# Patient Record
Sex: FEMALE | Race: WHITE | NOT HISPANIC OR LATINO | URBAN - METROPOLITAN AREA
[De-identification: names, ages, dates, MRNs, and addresses within clinical notes are randomized per-mention and may not be internally consistent; named-entity substitution may affect disease eponyms.]

---

## 2018-07-19 RX ORDER — METRONIDAZOLE 500 MG
1 TABLET ORAL
Qty: 0 | Refills: 0 | COMMUNITY
Start: 2018-07-19

## 2018-07-19 RX ORDER — CEFPODOXIME PROXETIL 100 MG
1 TABLET ORAL
Qty: 0 | Refills: 0 | COMMUNITY
Start: 2018-07-19

## 2018-08-04 ENCOUNTER — INPATIENT (INPATIENT)
Facility: HOSPITAL | Age: 68
LOS: 3 days | Discharge: HOSPICE | End: 2018-08-08
Attending: FAMILY MEDICINE | Admitting: FAMILY MEDICINE
Payer: MEDICARE

## 2018-08-04 ENCOUNTER — EMERGENCY (EMERGENCY)
Facility: HOSPITAL | Age: 68
LOS: 0 days | Discharge: HOME | End: 2018-08-04
Attending: EMERGENCY MEDICINE | Admitting: EMERGENCY MEDICINE
Payer: MEDICARE

## 2018-08-04 VITALS
SYSTOLIC BLOOD PRESSURE: 139 MMHG | DIASTOLIC BLOOD PRESSURE: 84 MMHG | OXYGEN SATURATION: 100 % | RESPIRATION RATE: 18 BRPM | TEMPERATURE: 98 F | HEART RATE: 95 BPM

## 2018-08-04 VITALS
WEIGHT: 119.93 LBS | HEART RATE: 112 BPM | DIASTOLIC BLOOD PRESSURE: 96 MMHG | RESPIRATION RATE: 18 BRPM | OXYGEN SATURATION: 98 % | SYSTOLIC BLOOD PRESSURE: 124 MMHG

## 2018-08-04 VITALS
OXYGEN SATURATION: 99 % | TEMPERATURE: 98 F | RESPIRATION RATE: 20 BRPM | SYSTOLIC BLOOD PRESSURE: 153 MMHG | DIASTOLIC BLOOD PRESSURE: 93 MMHG | HEART RATE: 134 BPM

## 2018-08-04 DIAGNOSIS — F32.9 MAJOR DEPRESSIVE DISORDER, SINGLE EPISODE, UNSPECIFIED: ICD-10-CM

## 2018-08-04 DIAGNOSIS — J90 PLEURAL EFFUSION, NOT ELSEWHERE CLASSIFIED: ICD-10-CM

## 2018-08-04 DIAGNOSIS — E11.9 TYPE 2 DIABETES MELLITUS WITHOUT COMPLICATIONS: ICD-10-CM

## 2018-08-04 DIAGNOSIS — D64.9 ANEMIA, UNSPECIFIED: ICD-10-CM

## 2018-08-04 DIAGNOSIS — G40.909 EPILEPSY, UNSPECIFIED, NOT INTRACTABLE, WITHOUT STATUS EPILEPTICUS: ICD-10-CM

## 2018-08-04 DIAGNOSIS — Z85.72 PERSONAL HISTORY OF NON-HODGKIN LYMPHOMAS: ICD-10-CM

## 2018-08-04 DIAGNOSIS — Z85.3 PERSONAL HISTORY OF MALIGNANT NEOPLASM OF BREAST: ICD-10-CM

## 2018-08-04 LAB
ALBUMIN SERPL ELPH-MCNC: 2 G/DL — LOW (ref 3.3–5)
ALP SERPL-CCNC: 186 U/L — HIGH (ref 40–120)
ALT FLD-CCNC: 8 U/L — LOW (ref 12–78)
ANION GAP SERPL CALC-SCNC: 7 MMOL/L — SIGNIFICANT CHANGE UP (ref 5–17)
ANISOCYTOSIS BLD QL: SLIGHT — SIGNIFICANT CHANGE UP
APPEARANCE UR: CLEAR — SIGNIFICANT CHANGE UP
AST SERPL-CCNC: 10 U/L — LOW (ref 15–37)
BACTERIA # UR AUTO: ABNORMAL
BASO STIPL BLD QL SMEAR: PRESENT — SIGNIFICANT CHANGE UP
BASOPHILS # BLD AUTO: 0.04 K/UL — SIGNIFICANT CHANGE UP (ref 0–0.2)
BASOPHILS NFR BLD AUTO: 0.2 % — SIGNIFICANT CHANGE UP (ref 0–2)
BILIRUB SERPL-MCNC: 0.3 MG/DL — SIGNIFICANT CHANGE UP (ref 0.2–1.2)
BILIRUB UR-MCNC: NEGATIVE — SIGNIFICANT CHANGE UP
BUN SERPL-MCNC: 20 MG/DL — SIGNIFICANT CHANGE UP (ref 7–23)
CALCIUM SERPL-MCNC: 8.4 MG/DL — LOW (ref 8.5–10.1)
CHLORIDE SERPL-SCNC: 104 MMOL/L — SIGNIFICANT CHANGE UP (ref 96–108)
CO2 SERPL-SCNC: 26 MMOL/L — SIGNIFICANT CHANGE UP (ref 22–31)
COLOR SPEC: YELLOW — SIGNIFICANT CHANGE UP
COMMENT - URINE: SIGNIFICANT CHANGE UP
CREAT SERPL-MCNC: 1.27 MG/DL — SIGNIFICANT CHANGE UP (ref 0.5–1.3)
DACRYOCYTES BLD QL SMEAR: SLIGHT — SIGNIFICANT CHANGE UP
DIFF PNL FLD: ABNORMAL
EOSINOPHIL # BLD AUTO: 0.01 K/UL — SIGNIFICANT CHANGE UP (ref 0–0.5)
EOSINOPHIL NFR BLD AUTO: 0.1 % — SIGNIFICANT CHANGE UP (ref 0–6)
EPI CELLS # UR: ABNORMAL
GLUCOSE SERPL-MCNC: 171 MG/DL — HIGH (ref 70–99)
GLUCOSE UR QL: 100 MG/DL
HCT VFR BLD CALC: 31.6 % — LOW (ref 34.5–45)
HGB BLD-MCNC: 10.4 G/DL — LOW (ref 11.5–15.5)
IMM GRANULOCYTES NFR BLD AUTO: 1.2 % — SIGNIFICANT CHANGE UP (ref 0–1.5)
KETONES UR-MCNC: NEGATIVE — SIGNIFICANT CHANGE UP
LEUKOCYTE ESTERASE UR-ACNC: ABNORMAL
LYMPHOCYTES # BLD AUTO: 0.36 K/UL — LOW (ref 1–3.3)
LYMPHOCYTES # BLD AUTO: 2.1 % — LOW (ref 13–44)
MACROCYTES BLD QL: SLIGHT — SIGNIFICANT CHANGE UP
MANUAL SMEAR VERIFICATION: SIGNIFICANT CHANGE UP
MCHC RBC-ENTMCNC: 31.2 PG — SIGNIFICANT CHANGE UP (ref 27–34)
MCHC RBC-ENTMCNC: 32.9 GM/DL — SIGNIFICANT CHANGE UP (ref 32–36)
MCV RBC AUTO: 94.9 FL — SIGNIFICANT CHANGE UP (ref 80–100)
MICROCYTES BLD QL: SLIGHT — SIGNIFICANT CHANGE UP
MONOCYTES # BLD AUTO: 0.83 K/UL — SIGNIFICANT CHANGE UP (ref 0–0.9)
MONOCYTES NFR BLD AUTO: 4.8 % — SIGNIFICANT CHANGE UP (ref 2–14)
NEUTROPHILS # BLD AUTO: 15.83 K/UL — HIGH (ref 1.8–7.4)
NEUTROPHILS NFR BLD AUTO: 91.6 % — HIGH (ref 43–77)
NITRITE UR-MCNC: NEGATIVE — SIGNIFICANT CHANGE UP
NRBC # BLD: 0 /100 WBCS — SIGNIFICANT CHANGE UP (ref 0–0)
OVALOCYTES BLD QL SMEAR: SLIGHT — SIGNIFICANT CHANGE UP
PH UR: 6 — SIGNIFICANT CHANGE UP (ref 5–8)
PLAT MORPH BLD: NORMAL — SIGNIFICANT CHANGE UP
PLATELET # BLD AUTO: 78 K/UL — LOW (ref 150–400)
POIKILOCYTOSIS BLD QL AUTO: SLIGHT — SIGNIFICANT CHANGE UP
POTASSIUM SERPL-MCNC: 3.3 MMOL/L — LOW (ref 3.5–5.3)
POTASSIUM SERPL-SCNC: 3.3 MMOL/L — LOW (ref 3.5–5.3)
PROT SERPL-MCNC: 5.2 GM/DL — LOW (ref 6–8.3)
PROT UR-MCNC: 100 MG/DL
RBC # BLD: 3.33 M/UL — LOW (ref 3.8–5.2)
RBC # FLD: 17.8 % — HIGH (ref 10.3–14.5)
RBC BLD AUTO: ABNORMAL
RBC CASTS # UR COMP ASSIST: ABNORMAL /HPF (ref 0–4)
SODIUM SERPL-SCNC: 137 MMOL/L — SIGNIFICANT CHANGE UP (ref 135–145)
SP GR SPEC: 1.01 — SIGNIFICANT CHANGE UP (ref 1.01–1.02)
TROPONIN I SERPL-MCNC: <0.015 NG/ML — SIGNIFICANT CHANGE UP (ref 0.01–0.04)
UROBILINOGEN FLD QL: NEGATIVE MG/DL — SIGNIFICANT CHANGE UP
WBC # BLD: 17.28 K/UL — HIGH (ref 3.8–10.5)
WBC # FLD AUTO: 17.28 K/UL — HIGH (ref 3.8–10.5)
WBC UR QL: ABNORMAL

## 2018-08-04 PROCEDURE — 93010 ELECTROCARDIOGRAM REPORT: CPT | Mod: 76

## 2018-08-04 PROCEDURE — 70450 CT HEAD/BRAIN W/O DYE: CPT | Mod: 26

## 2018-08-04 PROCEDURE — 99285 EMERGENCY DEPT VISIT HI MDM: CPT | Mod: NC

## 2018-08-04 PROCEDURE — 71045 X-RAY EXAM CHEST 1 VIEW: CPT | Mod: 26

## 2018-08-04 RX ORDER — SODIUM CHLORIDE 9 MG/ML
3 INJECTION INTRAMUSCULAR; INTRAVENOUS; SUBCUTANEOUS ONCE
Qty: 0 | Refills: 0 | Status: COMPLETED | OUTPATIENT
Start: 2018-08-04 | End: 2018-08-04

## 2018-08-04 RX ORDER — LEVETIRACETAM 250 MG/1
1000 TABLET, FILM COATED ORAL ONCE
Qty: 0 | Refills: 0 | Status: DISCONTINUED | OUTPATIENT
Start: 2018-08-04 | End: 2018-08-04

## 2018-08-04 RX ORDER — CEFTRIAXONE 500 MG/1
1000 INJECTION, POWDER, FOR SOLUTION INTRAMUSCULAR; INTRAVENOUS ONCE
Qty: 0 | Refills: 0 | Status: COMPLETED | OUTPATIENT
Start: 2018-08-04 | End: 2018-08-04

## 2018-08-04 RX ORDER — LEVETIRACETAM 250 MG/1
1 TABLET, FILM COATED ORAL
Qty: 60 | Refills: 0 | OUTPATIENT
Start: 2018-08-04 | End: 2018-09-02

## 2018-08-04 RX ORDER — CEPHALEXIN 500 MG
1 CAPSULE ORAL
Qty: 20 | Refills: 0 | OUTPATIENT
Start: 2018-08-04 | End: 2018-08-13

## 2018-08-04 RX ORDER — LEVETIRACETAM 250 MG/1
1000 TABLET, FILM COATED ORAL ONCE
Qty: 0 | Refills: 0 | Status: COMPLETED | OUTPATIENT
Start: 2018-08-04 | End: 2018-08-04

## 2018-08-04 RX ORDER — FOSPHENYTOIN 50 MG/ML
500 INJECTION INTRAMUSCULAR; INTRAVENOUS ONCE
Qty: 0 | Refills: 0 | Status: COMPLETED | OUTPATIENT
Start: 2018-08-04 | End: 2018-08-04

## 2018-08-04 RX ORDER — LACOSAMIDE 50 MG/1
100 TABLET ORAL ONCE
Qty: 0 | Refills: 0 | Status: DISCONTINUED | OUTPATIENT
Start: 2018-08-04 | End: 2018-08-04

## 2018-08-04 RX ORDER — CEPHALEXIN 500 MG
500 CAPSULE ORAL ONCE
Qty: 0 | Refills: 0 | Status: COMPLETED | OUTPATIENT
Start: 2018-08-04 | End: 2018-08-04

## 2018-08-04 RX ORDER — CEFTRIAXONE 500 MG/1
1 INJECTION, POWDER, FOR SOLUTION INTRAMUSCULAR; INTRAVENOUS ONCE
Qty: 0 | Refills: 0 | Status: DISCONTINUED | OUTPATIENT
Start: 2018-08-04 | End: 2018-08-04

## 2018-08-04 RX ADMIN — LEVETIRACETAM 400 MILLIGRAM(S): 250 TABLET, FILM COATED ORAL at 16:47

## 2018-08-04 RX ADMIN — LACOSAMIDE 100 MILLIGRAM(S): 50 TABLET ORAL at 22:21

## 2018-08-04 RX ADMIN — FOSPHENYTOIN 120 MILLIGRAM(S) PE: 50 INJECTION INTRAMUSCULAR; INTRAVENOUS at 21:03

## 2018-08-04 RX ADMIN — Medication 500 MILLIGRAM(S): at 19:29

## 2018-08-04 RX ADMIN — Medication 1 MILLIGRAM(S): at 21:03

## 2018-08-04 RX ADMIN — SODIUM CHLORIDE 3 MILLILITER(S): 9 INJECTION INTRAMUSCULAR; INTRAVENOUS; SUBCUTANEOUS at 12:51

## 2018-08-04 RX ADMIN — CEFTRIAXONE 1000 MILLIGRAM(S): 500 INJECTION, POWDER, FOR SOLUTION INTRAMUSCULAR; INTRAVENOUS at 21:24

## 2018-08-04 RX ADMIN — FOSPHENYTOIN 500 MILLIGRAM(S) PE: 50 INJECTION INTRAMUSCULAR; INTRAVENOUS at 21:03

## 2018-08-04 RX ADMIN — LACOSAMIDE 120 MILLIGRAM(S): 50 TABLET ORAL at 21:51

## 2018-08-04 NOTE — ED ADULT TRIAGE NOTE - CHIEF COMPLAINT QUOTE
Patient was discharged from  and while in EMS transport back to NH, had a reported seizure. In triage patient seen by MD Asif and appears post-ictal. Moved to trauma room.

## 2018-08-04 NOTE — ED PROVIDER NOTE - PROGRESS NOTE DETAILS
ELLIE No additional seizures in the ED.  Pt now awake and responding to questions. PT Tx with 3 agents and abortive medication.  Given presentation and findings, patient requires hospitalization likely requiring 4 days of inpatient care for medication titration.  Patient signed out to Dr. Higginbotham.  Patient's history, vital signs, lab results, imaging, pertinent findings, and clinical condition reviewed during sign out.  At sign out patient admitted in hemodynamically stable condition in no acute distress.

## 2018-08-04 NOTE — ED ADULT NURSE NOTE - NSIMPLEMENTINTERV_GEN_ALL_ED
Implemented All Fall with Harm Risk Interventions:  [place] to call system. Call bell, personal items and telephone within reach. Instruct patient to call for assistance. Room bathroom lighting operational. Non-slip footwear when patient is off stretcher. Physically safe environment: no spills, clutter or unnecessary equipment. Stretcher in lowest position, wheels locked, appropriate side rails in place. Provide visual cue, wrist band, yellow gown, etc. Monitor gait and stability. Monitor for mental status changes and reorient to person, place, and time. Review medications for side effects contributing to fall risk. Reinforce activity limits and safety measures with patient and family. Provide visual clues: red socks.

## 2018-08-04 NOTE — ED PROVIDER NOTE - PMH
Anemia    Breast cancer    Depression    DM (diabetes mellitus)    HTN (hypertension)    Lymphoma    Pleural effusion

## 2018-08-04 NOTE — ED ADULT NURSE NOTE - OBJECTIVE STATEMENT
Pt sent from [place] for seizures, another seizure witnessed by EMS prior to arrival.  Extensive cancer history.  RCW port accessed by IV team.  Cardiac and VS monitoring initiated on arrival.  Pt lethargic, unable to answer questions.  Incontinent, pericare provided, IAD to diony area and nonblanchable areas over sacrum without skin breakdown.

## 2018-08-04 NOTE — ED PROVIDER NOTE - OBJECTIVE STATEMENT
67 YOF with PMHx of B cell lymphoma, breast ca, DM2, HTN, anemia, depression, seizures, pleural effusion presenting to the ED from Nursing Home c/o x2 seizures this morning, x1 seizure in the ambulance to the hospital. Pt is postictal and nonverbal.  JW PT at baseline 10 minutes ago loaded with Keppra suffered a seizure after getting into the ambulance and driving 2 streets down.  Pt returns today now awake responsive to stimuli and post ictal. 67 YOF with PMHx of B cell lymphoma, breast ca, DM2, HTN, anemia, depression, seizures, pleural effusion presenting to the ED from Nursing Home c/o x2 seizures this morning, x1 seizure in the ambulance to the hospital.  JW PT at baseline 10 minutes ago loaded with Keppra suffered a seizure after getting into the ambulance and driving 2 streets down.  Pt returns today now awake responsive to stimuli, alert, oriented, and mildly post ictal.

## 2018-08-04 NOTE — ED ADULT NURSE NOTE - OBJECTIVE STATEMENT
pt presents to the ed by ambulance after being recently dc'd and as per ems she had a seizure. Pt is now post ictal, pt bit R side of her tongue and bottom part of her lip. Pt is tachycardic and hypertensive, no signs of difficulty breathing.

## 2018-08-04 NOTE — ED PROVIDER NOTE - MEDICAL DECISION MAKING DETAILS
67 YOF with PMHx of B cell lymphoma, breast ca, DM2, HTN, anemia, depression, seizures, pleural effusion presenting to the ED from Nursing Home c/o x2 seizures this morning, x1 seizure in the ambulance to the hospital.  HTN, tachycardia.  Post ictal state.  Lip abrasion.  Pt recently seized refractory to single agent loading and outpatient management.  Given presentation will medicate with additional agents and admit to telemetry for further neurology evaluation.

## 2018-08-04 NOTE — ED ADULT TRIAGE NOTE - CHIEF COMPLAINT QUOTE
pt presents to ED due to seizure sent from NH as per EMS pt had 4 seizures this AM and was having a seizure when pulling into ambulance bay

## 2018-08-04 NOTE — ED PROVIDER NOTE - ENMT, MLM
Airway patent, Nasal mucosa clear. Mouth with normal mucosa. Throat has no vesicles, no oropharyngeal exudates and uvula is midline. +small abrasion on lip

## 2018-08-04 NOTE — ED PROVIDER NOTE - NS_ ATTENDINGSCRIBEDETAILS _ED_A_ED_FT
The scribe's documentation has been prepared under my direction and personally reviewed by me in its entirety.  I confirm that the note above accurately reflects all my work, treatment, procedures, and decision making except where otherwise noted or amended by me.  Vasile Chan M.D.

## 2018-08-04 NOTE — ED ADULT NURSE NOTE - CHPI ED NUR SYMPTOMS NEG
no fever/no pain/no vomiting/no nausea/no tingling/no chills/no dizziness/no decreased eating/drinking

## 2018-08-04 NOTE — ED ADULT NURSE NOTE - INTERVENTIONS DEFINITIONS
Call bell, personal items and telephone within reach/Stretcher in lowest position, wheels locked, appropriate side rails in place

## 2018-08-04 NOTE — ED PROVIDER NOTE - PROGRESS NOTE DETAILS
ELLIE PT signed out to me in stable condition.  66 y/o f with PMHx of B cell lymphoma, breast ca, DM2, HTN, anemia, depression, seizures, pleural effusion presenting to the ED from Nursing Home c/o x2 seizures this morning, x1 seizure in the ambulance to the hospital.  Pt now awake alert in NAD.  Repeat exam reveals no signs of trauma. Pt states Seizures occurred in bed.  Sign out CT pending.  Will Tx with Keppra, Pt unclear if she is receiving seizure medications, continue to evaluate for secondary causes of seizure, symptomatic treatment, discuss with neurologist, reassess. JW Contacted NH.  No doctor available to discuss case. Redosed with Keppra.  Will prescribe Keflex. JW PT signed out to me in stable condition.  68 y/o f with PMHx of B cell lymphoma, breast ca, DM2, HTN, anemia, depression, seizures, pleural effusion presenting to the ED from Nursing Home c/o x2 seizures this morning, x1 seizure in the ambulance to the hospital.  Pt now awake alert in NAD.  Repeat exam reveals no signs of trauma. Pt states Seizures occurred in bed.  Sign out CT pending.  Will Tx with Keppra, Evaluate for secondary causes of seizure, symptomatic treatment, discuss with neurologist, reassess. JW Contacted NH.  No doctor available to discuss case. Redosed with Keppra.  Will prescribe Keflex.  At discharge PT AO 3 NAD responding to questions.  Understands that she may have a UTI and needs to follow up with neurologist and PMD.  PT loaded with Keppra given Keflex in the ED.  Pt endorses keppra administration at the NH. Repeat exam: Physical Exam: General: alert and oriented x3 in no acute distress.  Cardiovascular: Regular rate and rhythm, S1 and S2 normal.  No murmurs, rubs, or gallops.  Pulses equal bilaterally.  No carotid bruits.  No peripheral edema or JVD.  Respiratory: No respiratory distress.  Lungs clear to auscultation bilaterally without adventitial breath sounds.  Abdominal: Non-distended, normal bowel sounds in all four quadrants, non tender to percussion and palpation in all four quadrants.  No mass, rigidity, or guarding.  Extremities: No sign of trauma, inflammation, or effusion.  I reviewed the alarm symptoms of this patient's diagnosis and discussed criteria for their return to the emergency department.  I instructed the patient to return to the emergency department with any alarm symptoms for their specific diagnosis including fevers, chills, vomiting, any worsening symptoms, and any other concerns.  I instructed this patient to call their primary doctor today, to inform them of their visit to the emergency department, and to obtain a repeat evaluation in the next 24 hours.  This patient understood and agreed with our plan for follow up.  At the time of discharge this patient remained in stable condition, in no acute distress, with stable vital signs. W Contacted NH.  No doctor available to discuss case. Redosed with Keppra.  Will prescribe Keflex.  At discharge PT AO 3 NAD responding to questions.  Understands that she may have a UTI and needs to follow up with neurologist and PMD.  PT loaded with Keppra given Keflex in the ED.  Pt endorses keppra administration at the NH. Repeat exam: Physical Exam: General: alert and oriented x3 in no acute distress.  Cardiovascular: Regular rate and rhythm, S1 and S2 normal.  No murmurs, rubs, or gallops.  Pulses equal bilaterally.  No carotid bruits.  No peripheral edema or JVD.  Respiratory: No respiratory distress.  Lungs clear to auscultation bilaterally without adventitial breath sounds.  Abdominal: Non-distended, normal bowel sounds in all four quadrants, non tender to percussion and palpation in all four quadrants.  No mass, rigidity, or guarding.  Extremities: No sign of trauma, inflammation, or effusion.  I reviewed the alarm symptoms of this patient's diagnosis and discussed criteria for their return to the emergency department.  I instructed the patient to return to the emergency department with any alarm symptoms for their specific diagnosis including fevers, chills, vomiting, any worsening symptoms, and any other concerns.  I instructed this patient to call their primary doctor today, to inform them of their visit to the emergency department, and to obtain a repeat evaluation in the next 24 hours.  This patient understood and agreed with our plan for follow up.  At the time of discharge this patient remained in stable condition, in no acute distress, with stable vital signs. JW PT signed out to me in stable condition NAD pending CT scan results. With normal CT PT to be discharged with outpatient neurology follow up. JW  Reevaluation note.  68 y/o f with PMHx of B cell lymphoma, breast ca, DM2, HTN, anemia, depression, seizures, pleural effusion presenting to the ED from Nursing Home c/o x2 seizures this morning, x1 seizure in the ambulance to the hospital.  Pt now awake alert in NAD.  Repeat exam reveals no signs of trauma. Pt states Seizures occurred in bed.  Sign out CT pending.  Will Tx with Keppra, Evaluate for secondary causes of seizure, symptomatic treatment, discuss with neurologist, reassess. JW PT initially evaluated by Dr. Mayes signed out to me in stable condition NAD pending CT scan results. With normal CT PT to be discharged with outpatient neurology follow up.

## 2018-08-04 NOTE — ED PROVIDER NOTE - OBJECTIVE STATEMENT
66 y/o f with PMHx of B cell lymphoma, breast ca, DM2, HTN, anemia, depression, seizures, pleural effusion presenting to the ED from Nursing Home c/o x2 seizures this morning, x1 seizure in the ambulance to the hospital. Pt is postictal and nonverbal.

## 2018-08-05 DIAGNOSIS — Z90.11 ACQUIRED ABSENCE OF RIGHT BREAST AND NIPPLE: Chronic | ICD-10-CM

## 2018-08-05 LAB
ANION GAP SERPL CALC-SCNC: 11 MMOL/L — SIGNIFICANT CHANGE UP (ref 5–17)
BASOPHILS # BLD AUTO: 0.07 K/UL — SIGNIFICANT CHANGE UP (ref 0–0.2)
BASOPHILS NFR BLD AUTO: 0.4 % — SIGNIFICANT CHANGE UP (ref 0–2)
BUN SERPL-MCNC: 17 MG/DL — SIGNIFICANT CHANGE UP (ref 7–23)
CALCIUM SERPL-MCNC: 8.7 MG/DL — SIGNIFICANT CHANGE UP (ref 8.5–10.1)
CHLORIDE SERPL-SCNC: 104 MMOL/L — SIGNIFICANT CHANGE UP (ref 96–108)
CK SERPL-CCNC: 12 U/L — LOW (ref 26–192)
CK SERPL-CCNC: 9 U/L — LOW (ref 26–192)
CO2 SERPL-SCNC: 25 MMOL/L — SIGNIFICANT CHANGE UP (ref 22–31)
CREAT SERPL-MCNC: 1.11 MG/DL — SIGNIFICANT CHANGE UP (ref 0.5–1.3)
CULTURE RESULTS: SIGNIFICANT CHANGE UP
EOSINOPHIL # BLD AUTO: 0.05 K/UL — SIGNIFICANT CHANGE UP (ref 0–0.5)
EOSINOPHIL NFR BLD AUTO: 0.3 % — SIGNIFICANT CHANGE UP (ref 0–6)
FERRITIN SERPL-MCNC: 3124 NG/ML — HIGH (ref 15–150)
FOLATE SERPL-MCNC: 5.6 NG/ML — SIGNIFICANT CHANGE UP
GLUCOSE BLDC GLUCOMTR-MCNC: 111 MG/DL — HIGH (ref 70–99)
GLUCOSE BLDC GLUCOMTR-MCNC: 147 MG/DL — HIGH (ref 70–99)
GLUCOSE SERPL-MCNC: 111 MG/DL — HIGH (ref 70–99)
HCT VFR BLD CALC: 31.2 % — LOW (ref 34.5–45)
HGB BLD-MCNC: 10.2 G/DL — LOW (ref 11.5–15.5)
IMM GRANULOCYTES NFR BLD AUTO: 1 % — SIGNIFICANT CHANGE UP (ref 0–1.5)
IRON SATN MFR SERPL: 59 UG/DL — SIGNIFICANT CHANGE UP (ref 30–160)
IRON SATN MFR SERPL: 69 % — HIGH (ref 14–50)
LACTATE SERPL-SCNC: 1 MMOL/L — SIGNIFICANT CHANGE UP (ref 0.7–2)
LYMPHOCYTES # BLD AUTO: 0.44 K/UL — LOW (ref 1–3.3)
LYMPHOCYTES # BLD AUTO: 2.5 % — LOW (ref 13–44)
MAGNESIUM SERPL-MCNC: 1.4 MG/DL — LOW (ref 1.6–2.6)
MCHC RBC-ENTMCNC: 31.3 PG — SIGNIFICANT CHANGE UP (ref 27–34)
MCHC RBC-ENTMCNC: 32.7 GM/DL — SIGNIFICANT CHANGE UP (ref 32–36)
MCV RBC AUTO: 95.7 FL — SIGNIFICANT CHANGE UP (ref 80–100)
MONOCYTES # BLD AUTO: 0.8 K/UL — SIGNIFICANT CHANGE UP (ref 0–0.9)
MONOCYTES NFR BLD AUTO: 4.5 % — SIGNIFICANT CHANGE UP (ref 2–14)
NEUTROPHILS # BLD AUTO: 16.16 K/UL — HIGH (ref 1.8–7.4)
NEUTROPHILS NFR BLD AUTO: 91.3 % — HIGH (ref 43–77)
NRBC # BLD: 0 /100 WBCS — SIGNIFICANT CHANGE UP (ref 0–0)
NT-PROBNP SERPL-SCNC: HIGH PG/ML (ref 0–125)
PLATELET # BLD AUTO: 78 K/UL — LOW (ref 150–400)
POTASSIUM SERPL-MCNC: 3.2 MMOL/L — LOW (ref 3.5–5.3)
POTASSIUM SERPL-SCNC: 3.2 MMOL/L — LOW (ref 3.5–5.3)
RBC # BLD: 3.26 M/UL — LOW (ref 3.8–5.2)
RBC # FLD: 17.6 % — HIGH (ref 10.3–14.5)
SODIUM SERPL-SCNC: 140 MMOL/L — SIGNIFICANT CHANGE UP (ref 135–145)
SPECIMEN SOURCE: SIGNIFICANT CHANGE UP
TIBC SERPL-MCNC: 85 UG/DL — LOW (ref 220–430)
TROPONIN I SERPL-MCNC: <0.015 NG/ML — SIGNIFICANT CHANGE UP (ref 0.01–0.04)
TROPONIN I SERPL-MCNC: <0.015 NG/ML — SIGNIFICANT CHANGE UP (ref 0.01–0.04)
UIBC SERPL-MCNC: 26 UG/DL — LOW (ref 110–370)
VIT B12 SERPL-MCNC: 678 PG/ML — SIGNIFICANT CHANGE UP (ref 232–1245)
WBC # BLD: 17.69 K/UL — HIGH (ref 3.8–10.5)
WBC # FLD AUTO: 17.69 K/UL — HIGH (ref 3.8–10.5)

## 2018-08-05 PROCEDURE — 99285 EMERGENCY DEPT VISIT HI MDM: CPT

## 2018-08-05 RX ORDER — DEXTROSE MONOHYDRATE, SODIUM CHLORIDE, AND POTASSIUM CHLORIDE 50; .745; 4.5 G/1000ML; G/1000ML; G/1000ML
1000 INJECTION, SOLUTION INTRAVENOUS
Qty: 0 | Refills: 0 | Status: DISCONTINUED | OUTPATIENT
Start: 2018-08-05 | End: 2018-08-05

## 2018-08-05 RX ORDER — SODIUM CHLORIDE 9 MG/ML
1000 INJECTION, SOLUTION INTRAVENOUS
Qty: 0 | Refills: 0 | Status: DISCONTINUED | OUTPATIENT
Start: 2018-08-05 | End: 2018-08-06

## 2018-08-05 RX ORDER — DEXTROSE 50 % IN WATER 50 %
12.5 SYRINGE (ML) INTRAVENOUS ONCE
Qty: 0 | Refills: 0 | Status: DISCONTINUED | OUTPATIENT
Start: 2018-08-05 | End: 2018-08-06

## 2018-08-05 RX ORDER — OXYCODONE HYDROCHLORIDE 5 MG/1
5 TABLET ORAL EVERY 4 HOURS
Qty: 0 | Refills: 0 | Status: DISCONTINUED | OUTPATIENT
Start: 2018-08-05 | End: 2018-08-08

## 2018-08-05 RX ORDER — POTASSIUM CHLORIDE 20 MEQ
40 PACKET (EA) ORAL EVERY 4 HOURS
Qty: 0 | Refills: 0 | Status: COMPLETED | OUTPATIENT
Start: 2018-08-05 | End: 2018-08-06

## 2018-08-05 RX ORDER — NYSTATIN CREAM 100000 [USP'U]/G
1 CREAM TOPICAL THREE TIMES A DAY
Qty: 0 | Refills: 0 | Status: DISCONTINUED | OUTPATIENT
Start: 2018-08-05 | End: 2018-08-08

## 2018-08-05 RX ORDER — MIRTAZAPINE 45 MG/1
15 TABLET, ORALLY DISINTEGRATING ORAL AT BEDTIME
Qty: 0 | Refills: 0 | Status: DISCONTINUED | OUTPATIENT
Start: 2018-08-05 | End: 2018-08-08

## 2018-08-05 RX ORDER — DEXTROSE 50 % IN WATER 50 %
25 SYRINGE (ML) INTRAVENOUS ONCE
Qty: 0 | Refills: 0 | Status: DISCONTINUED | OUTPATIENT
Start: 2018-08-05 | End: 2018-08-06

## 2018-08-05 RX ORDER — INSULIN LISPRO 100/ML
VIAL (ML) SUBCUTANEOUS
Qty: 0 | Refills: 0 | Status: DISCONTINUED | OUTPATIENT
Start: 2018-08-05 | End: 2018-08-06

## 2018-08-05 RX ORDER — MAGNESIUM SULFATE 500 MG/ML
2 VIAL (ML) INJECTION ONCE
Qty: 0 | Refills: 0 | Status: COMPLETED | OUTPATIENT
Start: 2018-08-05 | End: 2018-08-05

## 2018-08-05 RX ORDER — MAGNESIUM OXIDE 400 MG ORAL TABLET 241.3 MG
400 TABLET ORAL
Qty: 0 | Refills: 0 | Status: DISCONTINUED | OUTPATIENT
Start: 2018-08-05 | End: 2018-08-08

## 2018-08-05 RX ORDER — DEXTROSE MONOHYDRATE, SODIUM CHLORIDE, AND POTASSIUM CHLORIDE 50; .745; 4.5 G/1000ML; G/1000ML; G/1000ML
1000 INJECTION, SOLUTION INTRAVENOUS
Qty: 0 | Refills: 0 | Status: DISCONTINUED | OUTPATIENT
Start: 2018-08-05 | End: 2018-08-06

## 2018-08-05 RX ORDER — PANTOPRAZOLE SODIUM 20 MG/1
40 TABLET, DELAYED RELEASE ORAL
Qty: 0 | Refills: 0 | Status: DISCONTINUED | OUTPATIENT
Start: 2018-08-05 | End: 2018-08-08

## 2018-08-05 RX ORDER — CEFTRIAXONE 500 MG/1
1000 INJECTION, POWDER, FOR SOLUTION INTRAMUSCULAR; INTRAVENOUS EVERY 24 HOURS
Qty: 0 | Refills: 0 | Status: DISCONTINUED | OUTPATIENT
Start: 2018-08-05 | End: 2018-08-06

## 2018-08-05 RX ORDER — GLUCAGON INJECTION, SOLUTION 0.5 MG/.1ML
1 INJECTION, SOLUTION SUBCUTANEOUS ONCE
Qty: 0 | Refills: 0 | Status: DISCONTINUED | OUTPATIENT
Start: 2018-08-05 | End: 2018-08-06

## 2018-08-05 RX ORDER — POTASSIUM CHLORIDE 20 MEQ
40 PACKET (EA) ORAL ONCE
Qty: 0 | Refills: 0 | Status: COMPLETED | OUTPATIENT
Start: 2018-08-05 | End: 2018-08-05

## 2018-08-05 RX ORDER — CEFTRIAXONE 500 MG/1
1 INJECTION, POWDER, FOR SOLUTION INTRAMUSCULAR; INTRAVENOUS EVERY 24 HOURS
Qty: 0 | Refills: 0 | Status: DISCONTINUED | OUTPATIENT
Start: 2018-08-05 | End: 2018-08-05

## 2018-08-05 RX ORDER — INSULIN LISPRO 100/ML
VIAL (ML) SUBCUTANEOUS AT BEDTIME
Qty: 0 | Refills: 0 | Status: DISCONTINUED | OUTPATIENT
Start: 2018-08-05 | End: 2018-08-06

## 2018-08-05 RX ORDER — DEXTROSE 50 % IN WATER 50 %
15 SYRINGE (ML) INTRAVENOUS ONCE
Qty: 0 | Refills: 0 | Status: DISCONTINUED | OUTPATIENT
Start: 2018-08-05 | End: 2018-08-06

## 2018-08-05 RX ORDER — LEVETIRACETAM 250 MG/1
500 TABLET, FILM COATED ORAL
Qty: 0 | Refills: 0 | Status: DISCONTINUED | OUTPATIENT
Start: 2018-08-05 | End: 2018-08-08

## 2018-08-05 RX ADMIN — Medication 50 GRAM(S): at 22:16

## 2018-08-05 RX ADMIN — CEFTRIAXONE 1000 MILLIGRAM(S): 500 INJECTION, POWDER, FOR SOLUTION INTRAMUSCULAR; INTRAVENOUS at 22:16

## 2018-08-05 RX ADMIN — DEXTROSE MONOHYDRATE, SODIUM CHLORIDE, AND POTASSIUM CHLORIDE 50 MILLILITER(S): 50; .745; 4.5 INJECTION, SOLUTION INTRAVENOUS at 12:11

## 2018-08-05 RX ADMIN — Medication 40 MILLIEQUIVALENT(S): at 12:11

## 2018-08-05 RX ADMIN — NYSTATIN CREAM 1 APPLICATION(S): 100000 CREAM TOPICAL at 21:51

## 2018-08-05 RX ADMIN — Medication 2.5 MILLIGRAM(S): at 12:11

## 2018-08-05 RX ADMIN — LEVETIRACETAM 500 MILLIGRAM(S): 250 TABLET, FILM COATED ORAL at 21:51

## 2018-08-05 RX ADMIN — MIRTAZAPINE 15 MILLIGRAM(S): 45 TABLET, ORALLY DISINTEGRATING ORAL at 21:50

## 2018-08-05 RX ADMIN — PANTOPRAZOLE SODIUM 40 MILLIGRAM(S): 20 TABLET, DELAYED RELEASE ORAL at 12:11

## 2018-08-05 RX ADMIN — MAGNESIUM OXIDE 400 MG ORAL TABLET 400 MILLIGRAM(S): 241.3 TABLET ORAL at 21:50

## 2018-08-05 RX ADMIN — Medication 40 MILLIEQUIVALENT(S): at 21:50

## 2018-08-05 NOTE — ED ADULT NURSE REASSESSMENT NOTE - NS ED NURSE REASSESS COMMENT FT1
unable to give potassium and mag meds, pt takes medications with apple sauce, no apple sauce in ER, check both main and peds, spoke with jon, will be given on 3E

## 2018-08-05 NOTE — H&P ADULT - ASSESSMENT
66 y/o female with PMHx of B cell lymphoma, breast ca, DM2, HTN, anemia, depression, seizures, h/o GI bleed, Thrombocytopenia, muscle weakness  presenting to the ED from Nursing Home [place] s/p 2 episodes of seizures as per nursing documentation. 1 seizure happened  in the ambulance to the hospital, she was loaded with Keppra.   Pt in ED was awake responsive to stimuli, alert, oriented, and mildly post ictal. Patient poor historian, but reports being diagnosed with UTI and strated on Abx at SNF, denies CP, cough, palpitations HA, visual changes, numbness or weakness, except generalized weakness, denies abd pain, urinary sx.  Patient does not recall events, postictal , confused where she is , but able to answer questions appropriately.     in Ed - T(F): 97.7 , Max: 98.1 HR: 91  (75 - 134) BP: 133/85 (116/63 - 164/90) RR: 14  (13 - 20) SpO2: 100% ( 98% - 100%) EKG - sinus tachy 130 on presentation, with STT depressions in lateral leads, troponin x1 neg, s/p Ativan, Fosphenytoin and Vimpat IV  WBC 17, Hb 10.4, Pl 78, K 3.3, Cr 1.27 , Glu 171, UA - pyuria , CT head - old infarct, CXR - left pleural effusion    * Recurrent seizures  * Metabolic encephalopathy due to UTI, seizures   - tele  - s/p IV ativan, Fosphenytoin, Vimpat  - c/w with higher dose of keppra 500 BID  - neurology evaluation  - MRI/MRA - r/o stoke  - replace electrolytes  - check TSH, B12  - speech and swallow evaluation  - PT evaluation      * UTI  - IV fluids  - IV ceftriaxone  - f/u urine cx  - trend WBC     * Thrombocytopenia, Anemia h/o breast CA, B- cell lymphoma  - c/w PO prednisone   - oncology consult  - monitor    * JOSE  - IV fluids  - avoid nephrotoxins    * Hypokalemia  - replace  - check Mg  - c/w PO magnesium    * DM2  - check a1C  - ISS    * GERD h/o GI bleed  - c/w PPI    * Protein-calories Malnutrition severe  - nutritional consult  - c/w remeron    * DVT proph IPC, due to low platelets will hold heparin fr now   IMPROVE VTE Individual Risk Assessment    RISK                                                                Points    [  ] Previous VTE                                                  3    [x  ] Thrombophilia                                               2    [  ] Lower limb paralysis                                      2        (unable to hold up >15 seconds)      [ x ] Current Cancer                                              2         (within 6 months)    [  ] Immobilization > 24 hrs                                1    [  ] ICU/CCU stay > 24 hours                              1    [ x ] Age > 60                                                      1    IMPROVE VTE Score _____5____    Time spend 75 min

## 2018-08-05 NOTE — H&P ADULT - NSHPPHYSICALEXAM_GEN_ALL_CORE
PHYSICAL EXAM:    Constitutional: NAD, awake and alert, malnourished   HEENT: PERR, EOMI, Normal Hearing, MMM  Neck: Soft and supple, No LAD, No JVD  Respiratory: Breath sounds are clear bilaterally, No wheezing, rales or rhonchi  Cardiovascular: S1 and S2, regular rate and rhythm, no Murmurs, gallops or rubs  Gastrointestinal: Bowel Sounds present, soft, nontender, nondistended, no guarding, no rebound  Extremities: No peripheral edema  Vascular: 2+ peripheral pulses  Neurological: A/O x 3, no focal deficits  Musculoskeletal: 5/5 strength b/l upper and lower extremities  Skin: No rashes, multiple petechia over trunk, lips  rectal : deferred, not indicated

## 2018-08-05 NOTE — H&P ADULT - CLICK TO LAUNCH ORM
History & Physical Examination    Patient Name: Kelli Lopes  MRN: [id number]  CSN: [id number]  YOB: 1996    Diagnosis: ulcerative colitis and PSc    Present Illness: diarrhea chroninc      No prescriptions prior to admission    No current .

## 2018-08-05 NOTE — SWALLOW BEDSIDE ASSESSMENT ADULT - PHARYNGEAL PHASE
mild latency in swallow onset: observable laryngeal lift no overt s/s penetration of aspiration at bedside ./Within functional limits

## 2018-08-05 NOTE — H&P ADULT - HISTORY OF PRESENT ILLNESS
68 y/o female with PMHx of B cell lymphoma, breast ca, DM2, HTN, anemia, depression, seizures, h/o GI bleed, Thrombocytopenia, muscle weakness  presenting to the ED from Nursing Home [place] s/p 2 episodes of seizures as per nursing documentation. 1 seizure happened  in the ambulance to the hospital, she was loaded with Keppra.   Pt in ED was awake responsive to stimuli, alert, oriented, and mildly post ictal. Patient poor historian, but reports being diagnosed with UTI and strated on Abx at SNF, denies CP, cough, palpitations HA, visual changes, numbness or weakness, except generalized weakness, denies abd pain, urinary sx.  Patient does not recall events, postictal , confused where she is , but able to answer questions appropriately.     in Ed - T(F): 97.7 , Max: 98.1 HR: 91  (75 - 134) BP: 133/85 (116/63 - 164/90) RR: 14  (13 - 20) SpO2: 100% ( 98% - 100%) EKG - sinus tachy 130 on presentation, with STT depressions in lateral leads, troponin x1 neg, s/p Ativan, Fosphenytoin and Vimpat IV  WBC 17, Hb 10.4, Pl 78, K 3.3, Cr 1.27 , Glu 171, UA - pyuria , CT head - old infarct, CXR - left pleural effusion

## 2018-08-05 NOTE — SWALLOW BEDSIDE ASSESSMENT ADULT - ORAL PHASE
reduction in efficiency of mastication 2/2 lack of full dentition, but adequate for soft chewable and Pt able to clear bolus form oral cavity

## 2018-08-05 NOTE — SWALLOW BEDSIDE ASSESSMENT ADULT - NS SPL SWALLOW CLINIC TRIAL FT
Rx: soft mechanical DM, thin liquids; straw OK, meds WHOLE in apple sauce.  Assist as necessary.  ?Supplement drink?   Do not rush Pt.     Disc with MD and with NSg and Pt.   Service will follow for tolerance.

## 2018-08-05 NOTE — SWALLOW BEDSIDE ASSESSMENT ADULT - SWALLOW EVAL: CRITERIA FOR SKILLED INTERVENTION MET
will follow for monitor of diet tolerance/current level of function same as previous level of function

## 2018-08-05 NOTE — ED ADULT NURSE REASSESSMENT NOTE - NS ED NURSE REASSESS COMMENT FT1
pt aaox4, received pt at 1030, port already accessed, resting comfortably on stretcher, no s/sx of distress noted, pt tolerated PO meds with apple sauce, speech and swallow test done at the bedside.  received an order from md pearce to draw blood from port, phleb unable to draw blood, port flushing without difficulty, blood draw noted from the line, blood obtained and sent.  awaiting for bed.

## 2018-08-05 NOTE — H&P ADULT - FAMILY HISTORY
Mother  Still living? Unknown  Family history of diabetes mellitus, Age at diagnosis: Age Unknown     Father  Still living? Unknown  Family history of stroke, Age at diagnosis: Age Unknown

## 2018-08-05 NOTE — SWALLOW BEDSIDE ASSESSMENT ADULT - SWALLOW EVAL: DIAGNOSIS
oral-pharayngeal skills within functional limits for po intake at level of SOFT MECHANICAL and THIN liquids.   Overall profile is of marked deconditioning, plus reduced dentition.

## 2018-08-05 NOTE — SWALLOW BEDSIDE ASSESSMENT ADULT - COMMENTS
66 y/o female with PMHx of B cell lymphoma, breast ca, DM2, HTN, anemia, depression, seizures, h/o GI bleed, Thrombocytopenia, muscle weakness  presenting to the ED from Nursing Home [place] s/p 2 episodes of seizures as per nursing documentation. 1 seizure happened  in the ambulance to the hospital, she was loaded with Keppra.   Pt in ED was awake responsive to stimuli, alert, oriented, and mildly post ictal.  Patient poor historian, but reports being diagnosed with UTI and started on Abx at SNF, denies CP, cough, palpitations HA, visual changes, numbness or weakness, except generalized weakness, denies abd pain, urinary sx.  Patient does not recall events, postictal , confused where she is , but able to answer questions appropriately.

## 2018-08-06 DIAGNOSIS — C85.90 NON-HODGKIN LYMPHOMA, UNSPECIFIED, UNSPECIFIED SITE: ICD-10-CM

## 2018-08-06 DIAGNOSIS — C50.919 MALIGNANT NEOPLASM OF UNSPECIFIED SITE OF UNSPECIFIED FEMALE BREAST: ICD-10-CM

## 2018-08-06 DIAGNOSIS — I63.9 CEREBRAL INFARCTION, UNSPECIFIED: ICD-10-CM

## 2018-08-06 LAB
ANION GAP SERPL CALC-SCNC: 10 MMOL/L — SIGNIFICANT CHANGE UP (ref 5–17)
BUN SERPL-MCNC: 16 MG/DL — SIGNIFICANT CHANGE UP (ref 7–23)
CALCIUM SERPL-MCNC: 8.7 MG/DL — SIGNIFICANT CHANGE UP (ref 8.5–10.1)
CHLORIDE SERPL-SCNC: 108 MMOL/L — SIGNIFICANT CHANGE UP (ref 96–108)
CHOLEST SERPL-MCNC: 83 MG/DL — SIGNIFICANT CHANGE UP (ref 10–199)
CK SERPL-CCNC: 11 U/L — LOW (ref 26–192)
CK SERPL-CCNC: 13 U/L — LOW (ref 26–192)
CO2 SERPL-SCNC: 23 MMOL/L — SIGNIFICANT CHANGE UP (ref 22–31)
CREAT SERPL-MCNC: 1.25 MG/DL — SIGNIFICANT CHANGE UP (ref 0.5–1.3)
GLUCOSE BLDC GLUCOMTR-MCNC: 124 MG/DL — HIGH (ref 70–99)
GLUCOSE BLDC GLUCOMTR-MCNC: 157 MG/DL — HIGH (ref 70–99)
GLUCOSE SERPL-MCNC: 134 MG/DL — HIGH (ref 70–99)
HBA1C BLD-MCNC: 5.6 % — SIGNIFICANT CHANGE UP (ref 4–5.6)
HCT VFR BLD CALC: 30.1 % — LOW (ref 34.5–45)
HDLC SERPL-MCNC: 37 MG/DL — LOW (ref 40–125)
HGB BLD-MCNC: 9.9 G/DL — LOW (ref 11.5–15.5)
LACTATE SERPL-SCNC: 0.9 MMOL/L — SIGNIFICANT CHANGE UP (ref 0.7–2)
LEVETIRACETAM SERPL-MCNC: 37.3 MCG/ML — SIGNIFICANT CHANGE UP (ref 12–46)
LIPID PNL WITH DIRECT LDL SERPL: 29 MG/DL — SIGNIFICANT CHANGE UP
MAGNESIUM SERPL-MCNC: 1.9 MG/DL — SIGNIFICANT CHANGE UP (ref 1.6–2.6)
MCHC RBC-ENTMCNC: 31.4 PG — SIGNIFICANT CHANGE UP (ref 27–34)
MCHC RBC-ENTMCNC: 32.9 GM/DL — SIGNIFICANT CHANGE UP (ref 32–36)
MCV RBC AUTO: 95.6 FL — SIGNIFICANT CHANGE UP (ref 80–100)
NRBC # BLD: 0 /100 WBCS — SIGNIFICANT CHANGE UP (ref 0–0)
NT-PROBNP SERPL-SCNC: HIGH PG/ML (ref 0–125)
PHOSPHATE SERPL-MCNC: 2.3 MG/DL — LOW (ref 2.5–4.5)
PLATELET # BLD AUTO: 81 K/UL — LOW (ref 150–400)
POTASSIUM SERPL-MCNC: 4.9 MMOL/L — SIGNIFICANT CHANGE UP (ref 3.5–5.3)
POTASSIUM SERPL-SCNC: 4.9 MMOL/L — SIGNIFICANT CHANGE UP (ref 3.5–5.3)
PROLACTIN SERPL-MCNC: 18 NG/ML — SIGNIFICANT CHANGE UP (ref 3.4–24.1)
RBC # BLD: 3.15 M/UL — LOW (ref 3.8–5.2)
RBC # FLD: 17.5 % — HIGH (ref 10.3–14.5)
SODIUM SERPL-SCNC: 141 MMOL/L — SIGNIFICANT CHANGE UP (ref 135–145)
TOTAL CHOLESTEROL/HDL RATIO MEASUREMENT: 2.2 RATIO — LOW (ref 3.3–7.1)
TRIGL SERPL-MCNC: 83 MG/DL — SIGNIFICANT CHANGE UP (ref 10–149)
TROPONIN I SERPL-MCNC: <0.015 NG/ML — SIGNIFICANT CHANGE UP (ref 0.01–0.04)
TSH SERPL-MCNC: 1.88 UU/ML — SIGNIFICANT CHANGE UP (ref 0.34–4.82)
WBC # BLD: 21.17 K/UL — HIGH (ref 3.8–10.5)
WBC # FLD AUTO: 21.17 K/UL — HIGH (ref 3.8–10.5)

## 2018-08-06 PROCEDURE — 99233 SBSQ HOSP IP/OBS HIGH 50: CPT

## 2018-08-06 PROCEDURE — 93010 ELECTROCARDIOGRAM REPORT: CPT

## 2018-08-06 PROCEDURE — 70547 MR ANGIOGRAPHY NECK W/O DYE: CPT | Mod: 26

## 2018-08-06 PROCEDURE — 95819 EEG AWAKE AND ASLEEP: CPT | Mod: 26

## 2018-08-06 PROCEDURE — 70551 MRI BRAIN STEM W/O DYE: CPT | Mod: 26

## 2018-08-06 PROCEDURE — 93306 TTE W/DOPPLER COMPLETE: CPT | Mod: 26

## 2018-08-06 RX ORDER — ASPIRIN/CALCIUM CARB/MAGNESIUM 324 MG
325 TABLET ORAL DAILY
Qty: 0 | Refills: 0 | Status: DISCONTINUED | OUTPATIENT
Start: 2018-08-06 | End: 2018-08-08

## 2018-08-06 RX ORDER — CEFEPIME 1 G/1
1000 INJECTION, POWDER, FOR SOLUTION INTRAMUSCULAR; INTRAVENOUS EVERY 12 HOURS
Qty: 0 | Refills: 0 | Status: DISCONTINUED | OUTPATIENT
Start: 2018-08-06 | End: 2018-08-08

## 2018-08-06 RX ORDER — LACTOBACILLUS ACIDOPHILUS 100MM CELL
1 CAPSULE ORAL
Qty: 0 | Refills: 0 | Status: DISCONTINUED | OUTPATIENT
Start: 2018-08-06 | End: 2018-08-08

## 2018-08-06 RX ORDER — ATORVASTATIN CALCIUM 80 MG/1
40 TABLET, FILM COATED ORAL AT BEDTIME
Qty: 0 | Refills: 0 | Status: DISCONTINUED | OUTPATIENT
Start: 2018-08-06 | End: 2018-08-08

## 2018-08-06 RX ORDER — FUROSEMIDE 40 MG
20 TABLET ORAL ONCE
Qty: 0 | Refills: 0 | Status: COMPLETED | OUTPATIENT
Start: 2018-08-06 | End: 2018-08-06

## 2018-08-06 RX ADMIN — ATORVASTATIN CALCIUM 40 MILLIGRAM(S): 80 TABLET, FILM COATED ORAL at 22:47

## 2018-08-06 RX ADMIN — NYSTATIN CREAM 1 APPLICATION(S): 100000 CREAM TOPICAL at 14:04

## 2018-08-06 RX ADMIN — OXYCODONE HYDROCHLORIDE 5 MILLIGRAM(S): 5 TABLET ORAL at 23:00

## 2018-08-06 RX ADMIN — PANTOPRAZOLE SODIUM 40 MILLIGRAM(S): 20 TABLET, DELAYED RELEASE ORAL at 06:07

## 2018-08-06 RX ADMIN — Medication 1 TABLET(S): at 11:45

## 2018-08-06 RX ADMIN — MIRTAZAPINE 15 MILLIGRAM(S): 45 TABLET, ORALLY DISINTEGRATING ORAL at 22:47

## 2018-08-06 RX ADMIN — MAGNESIUM OXIDE 400 MG ORAL TABLET 400 MILLIGRAM(S): 241.3 TABLET ORAL at 08:27

## 2018-08-06 RX ADMIN — Medication 1 TABLET(S): at 18:35

## 2018-08-06 RX ADMIN — Medication 20 MILLIGRAM(S): at 11:45

## 2018-08-06 RX ADMIN — Medication 2: at 12:52

## 2018-08-06 RX ADMIN — NYSTATIN CREAM 1 APPLICATION(S): 100000 CREAM TOPICAL at 22:49

## 2018-08-06 RX ADMIN — CEFEPIME 1000 MILLIGRAM(S): 1 INJECTION, POWDER, FOR SOLUTION INTRAMUSCULAR; INTRAVENOUS at 18:35

## 2018-08-06 RX ADMIN — OXYCODONE HYDROCHLORIDE 5 MILLIGRAM(S): 5 TABLET ORAL at 22:47

## 2018-08-06 RX ADMIN — NYSTATIN CREAM 1 APPLICATION(S): 100000 CREAM TOPICAL at 06:07

## 2018-08-06 RX ADMIN — Medication 2.5 MILLIGRAM(S): at 06:07

## 2018-08-06 RX ADMIN — LEVETIRACETAM 500 MILLIGRAM(S): 250 TABLET, FILM COATED ORAL at 06:07

## 2018-08-06 RX ADMIN — MAGNESIUM OXIDE 400 MG ORAL TABLET 400 MILLIGRAM(S): 241.3 TABLET ORAL at 18:29

## 2018-08-06 RX ADMIN — LEVETIRACETAM 500 MILLIGRAM(S): 250 TABLET, FILM COATED ORAL at 18:29

## 2018-08-06 RX ADMIN — Medication 40 MILLIEQUIVALENT(S): at 06:07

## 2018-08-06 RX ADMIN — Medication 325 MILLIGRAM(S): at 11:45

## 2018-08-06 NOTE — PROGRESS NOTE ADULT - SUBJECTIVE AND OBJECTIVE BOX
HPI:  66 y/o female with PMHx of B cell lymphoma, breast ca, DM2, HTN, anemia, depression, seizures, h/o GI bleed, Thrombocytopenia, muscle weakness  presenting to the ED from Nursing Home [place] s/p 2 episodes of seizures as per nursing documentation. 1 seizure happened  in the ambulance to the hospital, she was loaded with Keppra.   Pt in ED was awake responsive to stimuli, alert, oriented, and mildly post ictal. Patient poor historian, but reports being diagnosed with UTI and strated on Abx at SNF, denies CP, cough, palpitations HA, visual changes, numbness or weakness, except generalized weakness, denies abd pain, urinary sx.  Patient does not recall events, postictal , confused where she is , but able to answer questions appropriately. Pt was given keppra in ER and states that she was at [facility] for Rehab and walks with walker and all her work up and tests were done at [facility].     8/6/18 : Pt feels better, still mildly confused. No recurrent seizures per staff. had EEG no epileptic activity noted. Official report pending. No new c/o. MRI pending.    MEDICATIONS  (STANDING):  cefTRIAXone Injectable. 1000 milliGRAM(s) IV Push every 24 hours  dextrose 5%. 1000 milliLiter(s) (50 mL/Hr) IV Continuous <Continuous>  dextrose 50% Injectable 12.5 Gram(s) IV Push once  dextrose 50% Injectable 25 Gram(s) IV Push once  dextrose 50% Injectable 25 Gram(s) IV Push once  insulin lispro (HumaLOG) corrective regimen sliding scale   SubCutaneous three times a day before meals  insulin lispro (HumaLOG) corrective regimen sliding scale   SubCutaneous at bedtime  lactobacillus acidophilus 1 Tablet(s) Oral three times a day with meals  levETIRAcetam 500 milliGRAM(s) Oral two times a day  magnesium oxide 400 milliGRAM(s) Oral two times a day with meals  mirtazapine 15 milliGRAM(s) Oral at bedtime  nystatin Powder 1 Application(s) Topical three times a day  pantoprazole    Tablet 40 milliGRAM(s) Oral before breakfast  predniSONE   Tablet 2.5 milliGRAM(s) Oral daily      Vital Signs Last 24 Hrs  T(C): 37.2 (06 Aug 2018 04:37), Max: 37.2 (06 Aug 2018 04:37)  T(F): 98.9 (06 Aug 2018 04:37), Max: 98.9 (06 Aug 2018 04:37)  HR: 104 (06 Aug 2018 04:37) (91 - 104)  BP: 131/76 (06 Aug 2018 04:37) (125/72 - 136/80)  BP(mean): --  RR: 18 (06 Aug 2018 04:37) (16 - 18)  SpO2: 100% (06 Aug 2018 04:37) (100% - 100%)    Neurological exam:  HF: A x O x 3. Appropriately interactive, normal affect. Speech fluent, No Aphasia .  CN: VISHNU, EOMI, VFF, facial sensation normal, no NLFD, gag intact.  Motor: No pronator drift, gen mild bilat weakness without focality.   Sens: Intact to light touch   Reflexes: Symmetric and normal . BJ 2+, BR 2+, KJ 1+, AJ 1+, downgoing toes b/l  Coord:   slow FN   Gait/Balance: Cannot test                          9.9    21.17 )-----------( 81       ( 06 Aug 2018 07:16 )             30.1     08-06    141  |  108  |  16  ----------------------------<  134<H>  4.9   |  23  |  1.25    Ca    8.7      06 Aug 2018 07:16  Phos  2.3     08-06  Mg     1.9     08-06    TPro  5.2<L>  /  Alb  2.0<L>  /  TBili  0.3  /  DBili  x   /  AST  10<L>  /  ALT  8<L>  /  AlkPhos  186<H>  08-04    Radiology report:  - CT Head: 8/4/18    < from: CT Head No Cont (08.04.18 @ 16:10) >    IMPRESSION:       No acute intracranial findings. 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

## 2018-08-06 NOTE — DIETITIAN INITIAL EVALUATION ADULT. - ORAL INTAKE PTA
Pt states she has an appetite, but is unable to eat as much food as she used too. Pt reports losing 74lbs in the past 3-4 years./poor

## 2018-08-06 NOTE — PHYSICAL THERAPY INITIAL EVALUATION ADULT - ADDITIONAL COMMENTS
Patient poor historian, Unsure if she ambulated with a RW or not, but, remembers ambulating at [facility] with physical therapy.

## 2018-08-06 NOTE — PHYSICAL THERAPY INITIAL EVALUATION ADULT - PATIENT PROFILE REVIEW, REHAB EVAL
yes/MRI results reviewed with NICOLE SEYMOUR Solimine.  Per PA, patient OK for physical therapy today.

## 2018-08-06 NOTE — DIETITIAN INITIAL EVALUATION ADULT. - NS AS NUTRI DX NUTRIENT
Malnutrition/Meets criteria for severe protein-calorie malnutrition in the context of chronic illness.

## 2018-08-06 NOTE — CHART NOTE - NSCHARTNOTEFT_GEN_A_CORE
Upon Nutritional Assessment by the Registered Dietitian your patient was determined to meet criteria / has evidence of the following diagnosis/diagnoses:          [ ]  Mild Protein Calorie Malnutrition        [ ]  Moderate Protein Calorie Malnutrition        [x] Severe Protein Calorie Malnutrition        [ ] Unspecified Protein Calorie Malnutrition        [ ] Underweight / BMI <19        [ ] Morbid Obesity / BMI > 40      Findings as based on:  •  Comprehensive nutrition assessment and consultation  •  Calorie counts (nutrient intake analysis)  •  Food acceptance and intake status from observations by staff  •  Follow up  •  Patient education  •  Intervention secondary to interdisciplinary rounds  •   concerns      Treatment:    The following diet has been recommended:  -Ensure enlive BID  -Encourage PO intake  -Monitor weight and intake    PROVIDER Section:     By signing this assessment you are acknowledging and agree with the diagnosis/diagnoses assigned by the Registered Dietitian    Comments:

## 2018-08-06 NOTE — CONSULT NOTE ADULT - PROBLEM SELECTOR RECOMMENDATION 9
s/pSCT  status unclear  shall contact Dr Montalvo at [facility]  prognosis is unclear with current data

## 2018-08-06 NOTE — PROGRESS NOTE ADULT - ASSESSMENT
· Assessment		    68 y/o female with PMHx of B cell lymphoma, breast ca, DM2, HTN, anemia, depression, seizures, h/o GI bleed, Thrombocytopenia, muscle weakness  presenting to the ED from [facility] s/p 2 episodes of seizures as per nursing documentation    admitted with Recurrent seizures now controlled with keppra    REC admit.   MRI brain with Contrast.  Continue Keppra 500 mg q 12 hrs.  EEG  completed no epileptic activity.  Correct underlying metabolic causes.  Obtain information from Pt's oncologist from [facility].  Will f/u.

## 2018-08-06 NOTE — DIETITIAN INITIAL EVALUATION ADULT. - OTHER INFO
Pt seen for nutrition assessment, screened for malnutrition. Pt pmhx noted below. Pt reports appetite is present, but PO intake is greatly reduced. Pt states PO reduced because pt fill up quickly. Pt on soft thin liquid diet at [facility] (seen by SLP, texture approved). Pt states she has some difficulty with chewing, swallowing ok. Pt denies n/v/d/c. Pt's Ashok 14, stage I on sacrum, 2+ mild edema in left foot, 3+ mild edema in right foot.  NFPE performed on pt: Severe muscle wasting noted in clavicle, deltoid, moderate muscle wasting in calves and temple. Severe fat wasting noted in ribs and triceps (right tricep more severe than left tricep). Pt meets criteria for severe protein-calorie malnutrition in the context of chronic illness. Recommend ensure enlive BID, encourage PO intake, monitor weight and intake. will continue to monitor pt's nutritional status.

## 2018-08-06 NOTE — PROGRESS NOTE ADULT - ASSESSMENT
68 y/o female with PMHx of B cell lymphoma, breast ca, DM2, HTN, anemia, depression, seizures, h/o GI bleed, Thrombocytopenia, muscle weakness  presenting to the ED from [facility] s/p 2 episodes of seizures as per nursing documentation. 1 seizure happened  in the ambulance to the hospital, she was loaded with Keppra.    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   - tele  - s/p IV ativan, Fosphenytoin, Vimpat  - c/w with higher dose of keppra 500 BID  - neurology evaluation  - MRI/MRA - acute -subacute left cerebellum, bilateral  frontal consistent with embolic stroke  - replace electrolytes  - check TSH, B12 - wnl   - speech and swallow evaluation  - PT evaluation    - , statins  - GOC with brother - DNR/DNI  - comfort measures, does not want aggressive management, ok with IV abx, ok with home hospice   - palliative consult  - 2 de cho  - cardiology consult     * UTI, worsening of leukocytosis   - IV ceftriaxone  - f/u urine cx - repeat   - trend WBC  - worsening   - ID consult     * Acute heart failure systolic vs diastolic  - IV lasix one dose today   - 2 d echo - pending  - cardiology consult      * Thrombocytopenia, Anemia h/o breast CA, B- cell lymphoma  - c/w PO prednisone   - oncology consult   - monitor    * JOSE  - s/p IV fluids  - avoid nephrotoxins    * Hypokalemia  - replace  - check Mg  - c/w PO magnesium    * h/o DM2  - check a1C  5.6  - ISS - stop FBG     * GERD h/o GI bleed  - c/w PPI    * Protein-calories Malnutrition severe  - nutritional consult  - c/w remeron    * DVT proph IPC, due to low platelets will hold heparin fr now     Advanced directives - 30 minutes spend   RN Kaykay Watters d/w HCP brother  -  comfort care , palliative consult, will c/w IV abx

## 2018-08-06 NOTE — PROVIDER CONTACT NOTE (OTHER) - NAME OF MD/NP/PA/DO NOTIFIED:
Neuro consult Dr. GERARDO Le left msg with [facility]
Dr. Palla
Dr. Quijano
Nir. Service aware of consult. Spoke with Mario. Dr. Banks on-call.

## 2018-08-06 NOTE — PHYSICAL THERAPY INITIAL EVALUATION ADULT - MODALITIES TREATMENT COMMENTS
Upon return from bathroom, patient was ambulating with RW and mod Ax1 when she  became limp.  Patient pulled onto this therapist's knee and RN over to assist.   Patient assisted to the floor where she began twitching B U&LEs. Patient not responsive to verbal questions. She was then lifted back to bed. Patient then alert, stated she thinks she "just had a seizure".  Patient left in bed with RN.  Per tele, HR to the 160s during event. CHARLETTE Watters informed of event.

## 2018-08-06 NOTE — CONSULT NOTE ADULT - ASSESSMENT
66 y/o female with PMHx of B cell lymphoma, breast ca, DM2, HTN, anemia, depression, seizures, h/o GI bleed, Thrombocytopenia, muscle weakness admitted on 8/4 from snf for evaluation of seizures at snf; patient was on rx for urinary tract infection prior to admission. History per medical record as patient is overall a poor historian.  1. Patient admitted with seizures; found to have urinary tract infection, culture suggestive of more than one organism, ? contamination and still with high wbc count  - will optimize antibiotics to cefepime on off chance there is Pseudomonas  - iv hydration and supportive care   - serial cbc and monitor temperature   - reviewed prior medical records to evaluate for resistant or atypical pathogens   - will not use carbapenem given that patient was admitted with seizures  2. other issues:  B cell lymphoma, breast ca, DM2, HTN, anemia, depression, seizures, h/o GI bleed, Thrombocytopenia, muscle weakness  - per medicine
66 y/o female with PMHx of B cell lymphoma, breast ca, DM2, HTN, anemia, depression, seizures, h/o GI bleed, Thrombocytopenia, muscle weakness  presenting to the ED from [facility] s/p 2 episodes of seizures as per nursing documentation    admitted with Recurrent seizures now controlled with keppra    REC admit.   MRI brain with Contrast.  Continue Keppra 500 mg q 12 hrs.  EEG / monitor if needed.  Correct underlying metabolic causes.  Obtain information from Pt's oncologist from [facility].  Will f/u.

## 2018-08-06 NOTE — PHYSICAL THERAPY INITIAL EVALUATION ADULT - PERTINENT HX OF CURRENT PROBLEM, REHAB EVAL
68 yo F admitted from [facility] s/p 2 episodes of seizures as per nursing documentation. 1 seizure happened  in the ambulance to the hospital.  In ED: EKG - sinus tachy 130 on presentation, with STT depressions in lateral leads,  CT head - old infarct, CXR - left pleural effusion.  MRI:  consistent with multi territorial acute-subacute thromboembolic infarcts,.

## 2018-08-06 NOTE — PROGRESS NOTE ADULT - SUBJECTIVE AND OBJECTIVE BOX
Subjective:  Patient is a 67y old  Female who presents with a chief complaint of seizures     HPI:     68 y/o female with PMHx of B cell lymphoma, breast ca, DM2, HTN, anemia, depression, seizures, h/o GI bleed, Thrombocytopenia, muscle weakness  presenting to the ED on 18  from [facility] [place] s/p 2 episodes of seizures as per nursing documentation. 1 seizure happened  in the ambulance to the hospital, she was loaded with Keppra.   Pt in ED was awake responsive to stimuli, alert, oriented, and mildly post ictal. Patient poor historian, but reports being diagnosed with UTI and strated on Abx at SNF, denies CP, cough, palpitations HA, visual changes, numbness or weakness, except generalized weakness, denies abd pain, urinary sx.  Patient does not recall events, postictal , confused where she is , but able to answer questions appropriately.     in Ed - T(F): 97.7 , Max: 98.1 HR: 91  (75 - 134) BP: 133/85 (116/63 - 164/90) RR: 14  (13 - 20) SpO2: 100% ( 98% - 100%) EKG - sinus tachy 130 on presentation, with STT depressions in lateral leads, troponin x1 neg, s/p Ativan, Fosphenytoin and Vimpat IV  WBC 17, Hb 10.4, Pl 78, K 3.3, Cr 1.27 , Glu 171, UA - pyuria , CT head - old infarct, CXR - left pleural effusion     18 - Patient seen and examined at bedside earlier today, poor historian, denies pain, has had episode of sudden weakness while walking, + gen weakness     Review of system- Rest of the review of system are negative except mentioned in HPI    T(C): 36.1 (18 @ 10:33), Max: 37.2 (18 @ 04:37)  T(F): 97 (18 @ 10:33), Max: 98.9 (18 @ 04:37)  HR: 98 (18 @ 12:17) (91 - 104)  BP: 128/73 (18 @ 12:17) (125/72 - 143/73)  RR: 18 (18 @ 10:33) (16 - 18)  SpO2: 100% (18 @ 12:17) (100% - 100%)  Wt(kg): --    Daily Weight in k.7 (06 Aug 2018 11:29)    PHYSICAL EXAM:  GENERAL: NAD  NERVOUS SYSTEM:  Alert & Oriented X3, non- focal exam, Motor Strength 5/5 B/L upper and lower extremities; DTRs 2+ intact and symmetric  HEAD:  Atraumatic, Normocephalic  EYES: EOMI, PERRLA, conjunctiva and sclera clear  HEENT: Moist mucous membranes  NECK: Supple, No JVD  CHEST/LUNG: Clear to auscultation bilaterally; No rales, no rhonchi, no wheezing, or rubs  HEART: Regular rate and rhythm; No murmurs, rubs, or gallops  ABDOMEN: Soft, Nontender, Nondistended; Bowel sounds present  GENITOURINARY- Voiding, no suprapubic tenderness  EXTREMITIES:  2+ Peripheral Pulses, No clubbing, cyanosis, or edema  MUSCULOSKELETAL:- No muscle tenderness, Muscle tone normal, No joint tenderness, no Joint swelling, Joint range of motion-normal  SKIN-no rash, no lesion    LABS:                        9.9    21.17 )-----------( 81       ( 06 Aug 2018 07:16 )             30.1     08-06    141  |  108  |  16  ----------------------------<  134<H>  4.9   |  23  |  1.25    Ca    8.7      06 Aug 2018 07:16  Phos  2.3     08-06  Mg     1.9     08-06        CARDIAC MARKERS ( 06 Aug 2018 07:16 )  x     / x     / 11 U/L / x     / x      CARDIAC MARKERS ( 06 Aug 2018 02:20 )  <0.015 ng/mL / x     / 13 U/L / x     / x      CARDIAC MARKERS ( 05 Aug 2018 22:45 )  <0.015 ng/mL / x     / 9 U/L / x     / x      CARDIAC MARKERS ( 05 Aug 2018 12:36 )  <0.015 ng/mL / x     / 12 U/L / x     / x          Urinalysis Basic - ( 04 Aug 2018 17:42 )    Color: Yellow / Appearance: Clear / S.010 / pH: x  Gluc: x / Ketone: Negative  / Bili: Negative / Urobili: Negative mg/dL   Blood: x / Protein: 100 mg/dL / Nitrite: Negative   Leuk Esterase: Moderate / RBC: 3-5 /HPF / WBC 11-25   Sq Epi: x / Non Sq Epi: Moderate / Bacteria: Few        CAPILLARY BLOOD GLUCOSE      POCT Blood Glucose.: 157 mg/dL (06 Aug 2018 12:41)  POCT Blood Glucose.: 124 mg/dL (06 Aug 2018 08:28)  POCT Blood Glucose.: 147 mg/dL (05 Aug 2018 21:42)        RECENT CULTURES:  Culture - Urine (18 @ 17:42)    Specimen Source: .Urine None    Culture Results:   Culture grew 3 or more types of organisms which indicate  collection contamination; consider recollection only if clinically  indicated.      RADIOLOGY & ADDITIONAL TESTS:  < from: MR Head No Cont (18 @ 09:45) >  BRAIN MRI:  Several acute-subacute subcentimeter infarcts within the left cerebellum,   right frontal centrum semiovale, right precentral gyrus and lateral left   superior frontal gyrus, 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 No acute intracranial hemorrhage. No significant   mass effect. No midline shift.  Chronic small left frontal and parietal white matter infarcts.  Chronic bilateral cerebellar lacunar infarcts.  BRAIN MRA:  No significant stenosis, occlusion, or aneurysm.  Mild-moderate focal stenosis of the proximal bilateral petrous segments   ofthe ICAs.  Fetal origin of the bilateral PCAs.  Right vertebral artery likely terminates at the PICA.  NECK MRA:  No hemodynamically significant stenosis or occlusion.  Partially imaged moderate sized left pleural effusion, corresponding to   findings on chest radiograph of 18.  < end of copied text >    Current medications:  aspirin 325 milliGRAM(s) Oral daily  atorvastatin 40 milliGRAM(s) Oral at bedtime  cefepime  Injectable. 1000 milliGRAM(s) IV Push every 12 hours  dextrose 40% Gel 15 Gram(s) Oral once PRN  dextrose 5%. 1000 milliLiter(s) IV Continuous <Continuous>  dextrose 50% Injectable 12.5 Gram(s) IV Push once  dextrose 50% Injectable 25 Gram(s) IV Push once  dextrose 50% Injectable 25 Gram(s) IV Push once  glucagon  Injectable 1 milliGRAM(s) IntraMuscular once PRN  insulin lispro (HumaLOG) corrective regimen sliding scale   SubCutaneous three times a day before meals  insulin lispro (HumaLOG) corrective regimen sliding scale   SubCutaneous at bedtime  lactobacillus acidophilus 1 Tablet(s) Oral three times a day with meals  levETIRAcetam 500 milliGRAM(s) Oral two times a day  LORazepam   Injectable 0.5 milliGRAM(s) IntraMuscular every 6 hours PRN  magnesium oxide 400 milliGRAM(s) Oral two times a day with meals  mirtazapine 15 milliGRAM(s) Oral at bedtime  nystatin Powder 1 Application(s) Topical three times a day  oxyCODONE    IR 5 milliGRAM(s) Oral every 4 hours PRN  pantoprazole    Tablet 40 milliGRAM(s) Oral before breakfast  predniSONE   Tablet 2.5 milliGRAM(s) Oral daily

## 2018-08-07 LAB
ANION GAP SERPL CALC-SCNC: 10 MMOL/L — SIGNIFICANT CHANGE UP (ref 5–17)
BUN SERPL-MCNC: 17 MG/DL — SIGNIFICANT CHANGE UP (ref 7–23)
CALCIUM SERPL-MCNC: 8.6 MG/DL — SIGNIFICANT CHANGE UP (ref 8.5–10.1)
CHLORIDE SERPL-SCNC: 108 MMOL/L — SIGNIFICANT CHANGE UP (ref 96–108)
CO2 SERPL-SCNC: 24 MMOL/L — SIGNIFICANT CHANGE UP (ref 22–31)
CREAT SERPL-MCNC: 1.44 MG/DL — HIGH (ref 0.5–1.3)
CULTURE RESULTS: SIGNIFICANT CHANGE UP
GLUCOSE SERPL-MCNC: 208 MG/DL — HIGH (ref 70–99)
HCT VFR BLD CALC: 28.2 % — LOW (ref 34.5–45)
HGB BLD-MCNC: 9.1 G/DL — LOW (ref 11.5–15.5)
LEVETIRACETAM SERPL-MCNC: 30.8 MCG/ML — SIGNIFICANT CHANGE UP (ref 12–46)
MCHC RBC-ENTMCNC: 31.4 PG — SIGNIFICANT CHANGE UP (ref 27–34)
MCHC RBC-ENTMCNC: 32.3 GM/DL — SIGNIFICANT CHANGE UP (ref 32–36)
MCV RBC AUTO: 97.2 FL — SIGNIFICANT CHANGE UP (ref 80–100)
NRBC # BLD: 0 /100 WBCS — SIGNIFICANT CHANGE UP (ref 0–0)
PLATELET # BLD AUTO: 84 K/UL — LOW (ref 150–400)
POTASSIUM SERPL-MCNC: 4.5 MMOL/L — SIGNIFICANT CHANGE UP (ref 3.5–5.3)
POTASSIUM SERPL-SCNC: 4.5 MMOL/L — SIGNIFICANT CHANGE UP (ref 3.5–5.3)
RBC # BLD: 2.9 M/UL — LOW (ref 3.8–5.2)
RBC # FLD: 17.6 % — HIGH (ref 10.3–14.5)
SODIUM SERPL-SCNC: 142 MMOL/L — SIGNIFICANT CHANGE UP (ref 135–145)
SPECIMEN SOURCE: SIGNIFICANT CHANGE UP
WBC # BLD: 23.43 K/UL — HIGH (ref 3.8–10.5)
WBC # FLD AUTO: 23.43 K/UL — HIGH (ref 3.8–10.5)

## 2018-08-07 PROCEDURE — 99232 SBSQ HOSP IP/OBS MODERATE 35: CPT

## 2018-08-07 PROCEDURE — 93010 ELECTROCARDIOGRAM REPORT: CPT

## 2018-08-07 RX ADMIN — MAGNESIUM OXIDE 400 MG ORAL TABLET 400 MILLIGRAM(S): 241.3 TABLET ORAL at 17:12

## 2018-08-07 RX ADMIN — PANTOPRAZOLE SODIUM 40 MILLIGRAM(S): 20 TABLET, DELAYED RELEASE ORAL at 06:52

## 2018-08-07 RX ADMIN — Medication 1 TABLET(S): at 08:09

## 2018-08-07 RX ADMIN — LEVETIRACETAM 500 MILLIGRAM(S): 250 TABLET, FILM COATED ORAL at 06:52

## 2018-08-07 RX ADMIN — NYSTATIN CREAM 1 APPLICATION(S): 100000 CREAM TOPICAL at 13:24

## 2018-08-07 RX ADMIN — Medication 1 TABLET(S): at 11:47

## 2018-08-07 RX ADMIN — Medication 2.5 MILLIGRAM(S): at 06:52

## 2018-08-07 RX ADMIN — CEFEPIME 1000 MILLIGRAM(S): 1 INJECTION, POWDER, FOR SOLUTION INTRAMUSCULAR; INTRAVENOUS at 17:59

## 2018-08-07 RX ADMIN — CEFEPIME 1000 MILLIGRAM(S): 1 INJECTION, POWDER, FOR SOLUTION INTRAMUSCULAR; INTRAVENOUS at 07:09

## 2018-08-07 RX ADMIN — LEVETIRACETAM 500 MILLIGRAM(S): 250 TABLET, FILM COATED ORAL at 17:12

## 2018-08-07 RX ADMIN — NYSTATIN CREAM 1 APPLICATION(S): 100000 CREAM TOPICAL at 06:53

## 2018-08-07 RX ADMIN — Medication 325 MILLIGRAM(S): at 11:05

## 2018-08-07 RX ADMIN — Medication 1 TABLET(S): at 17:12

## 2018-08-07 RX ADMIN — NYSTATIN CREAM 1 APPLICATION(S): 100000 CREAM TOPICAL at 21:34

## 2018-08-07 RX ADMIN — MIRTAZAPINE 15 MILLIGRAM(S): 45 TABLET, ORALLY DISINTEGRATING ORAL at 21:35

## 2018-08-07 RX ADMIN — OXYCODONE HYDROCHLORIDE 5 MILLIGRAM(S): 5 TABLET ORAL at 04:11

## 2018-08-07 RX ADMIN — MAGNESIUM OXIDE 400 MG ORAL TABLET 400 MILLIGRAM(S): 241.3 TABLET ORAL at 08:09

## 2018-08-07 NOTE — PROGRESS NOTE ADULT - SUBJECTIVE AND OBJECTIVE BOX
HPI from ED: 68 y/o female with PMHx of B cell lymphoma, breast ca, DM2, HTN, anemia, depression, seizures, h/o GI bleed, Thrombocytopenia, muscle weakness  presenting to the ED on 8/5/18 from [facility] s/p 2 episodes of seizures as per nursing documentation. 1 seizure happened in the ambulance to the hospital, she was loaded with Keppra.  Pt in ED was awake responsive to stimuli, alert, oriented, and mildly post ictal. Patient poor historian, but reports being diagnosed with UTI and started on Abx at SNF.     8/7/18 - Patient seen and examined at bedside with Dr. Rodriguez. Pt. denies any complaints, resting comfortably.     REVIEW OF SYSTEMS: all negative except for comments above.    Vital Signs Last 24 Hrs  T(C): 37.5 (07 Aug 2018 10:13), Max: 37.5 (07 Aug 2018 10:13)  T(F): 99.5 (07 Aug 2018 10:13), Max: 99.5 (07 Aug 2018 10:13)  HR: 104 (07 Aug 2018 10:13) (84 - 107)  BP: 126/63 (07 Aug 2018 10:13) (123/67 - 130/59)  BP(mean): --  RR: 19 (07 Aug 2018 04:39) (18 - 20)  SpO2: 100% (07 Aug 2018 10:13) (99% - 100%)    PHYSICAL EXAM:    GEN: lying in bed, NAD  HEENT:   NC/AT  CV:  +S1, +S2, RRR  RESP:  CTA B/L  BREAST:  not examined  GI:  abdomen soft, non-tender, non-distended, normoactive BS  RECTAL:  not examined  MSK:   normal muscle tone  EXT:  no edema  NEURO:  alert and oriented to self, confused at times (as per pt's brother that is her baseline)  SKIN:  no rashes    Labs & Radiology:                          9.1    23.43 )-----------( 84       ( 07 Aug 2018 07:09 )             28.2     08-07    142  |  108  |  17  ----------------------------<  208<H>  4.5   |  24  |  1.44<H>    Ca    8.6      07 Aug 2018 07:09  Phos  2.3     08-06  Mg     1.9     08-06      BRAIN MRI:    Several acute-subacute subcentimeter infarcts within the left cerebellum,   right frontal centrum semiovale, right precentral gyrus and lateral left   superior frontal gyrus, 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 No acute intracranial hemorrhage. No significant   mass effect. No midline shift.  Chronic small left frontal and parietal white matter infarcts.  Chronic bilateral cerebellar lacunar infarcts.    BRAIN MRA:    No significant stenosis, occlusion, or aneurysm.  Mild-moderate focal stenosis of the proximal bilateral petrous segments   ofthe ICAs.  Fetal origin of the bilateral PCAs.  Right vertebral artery likely terminates at the PICA.    NECK MRA:  No hemodynamically significant stenosis or occlusion.  Partially imaged moderate sized left pleural effusion, corresponding to   findings on chest radiograph of 8/4/18.      2D Echo:   Summary     Normal appearing mitral valve structure and function.   EA reversal of the mitral inflow consistent with reduced compliance of   the   left ventricle.   Moderate (2+) mitral regurgitation is present.   Mild mitral annular calcification is present.   Normal aortic valve structure and function.   Mild (1+) aortic regurgitation is present.   Normal appearing tricuspid valve structure and function.   Mild (1+) tricuspid valve regurgitation is present.   Normal appearing pulmonic valve structure and function.   Mild pulmonic valvular regurgitation (1+) is present.   Normal appearing left atrium.   The left ventricle is normal in size, wall thickness, wall motion and   contractility.   Estimated left ventricular ejection fraction is 55-60 %.        MEDICATIONS  (STANDING):  aspirin 325 milliGRAM(s) Oral daily  atorvastatin 40 milliGRAM(s) Oral at bedtime  cefepime  Injectable. 1000 milliGRAM(s) IV Push every 12 hours  lactobacillus acidophilus 1 Tablet(s) Oral three times a day with meals  levETIRAcetam 500 milliGRAM(s) Oral two times a day  magnesium oxide 400 milliGRAM(s) Oral two times a day with meals  mirtazapine 15 milliGRAM(s) Oral at bedtime  nystatin Powder 1 Application(s) Topical three times a day  pantoprazole    Tablet 40 milliGRAM(s) Oral before breakfast  predniSONE   Tablet 2.5 milliGRAM(s) Oral daily    MEDICATIONS  (PRN):  LORazepam   Injectable 0.5 milliGRAM(s) IntraMuscular every 6 hours PRN Agitation  oxyCODONE    IR 5 milliGRAM(s) Oral every 4 hours PRN Moderate Pain (4 - 6) HPI from ED: 68 y/o female with PMHx of B cell lymphoma, breast ca, DM2, HTN, anemia, depression, seizures, h/o GI bleed, Thrombocytopenia, muscle weakness  presenting to the ED on 8/5/18 from [facility] s/p 2 episodes of seizures as per nursing documentation. 1 seizure happened in the ambulance to the hospital, she was loaded with Keppra.  Pt in ED was awake responsive to stimuli, alert, oriented, and mildly post ictal. Patient poor historian, but reports being diagnosed with UTI and started on Abx at SNF.     8/7/18 - Patient seen and examined at bedside with Dr. Rodriguez. Pt. denies any complaints, resting comfortably.     REVIEW OF SYSTEMS: all negative except for comments above.    Vital Signs Last 24 Hrs  T(C): 37.5 (07 Aug 2018 10:13), Max: 37.5 (07 Aug 2018 10:13)  T(F): 99.5 (07 Aug 2018 10:13), Max: 99.5 (07 Aug 2018 10:13)  HR: 104 (07 Aug 2018 10:13) (84 - 107)  BP: 126/63 (07 Aug 2018 10:13) (123/67 - 130/59)  BP(mean): --  RR: 19 (07 Aug 2018 04:39) (18 - 20)  SpO2: 100% (07 Aug 2018 10:13) (99% - 100%)    PHYSICAL EXAM:    GEN: lying in bed, NAD  HEENT:   NC/AT  CV:  +S1, +S2, RRR  RESP:  CTA B/L  BREAST:  not examined  GI:  abdomen soft, non-tender, non-distended, normoactive BS  RECTAL:  not examined  MSK:   normal muscle tone  EXT:  no edema  NEURO:  alert and oriented to self, confused at times (as per pt's brother that is her baseline)  SKIN:  no rashes, RCW port, site looks clean    Labs & Radiology:                          9.1    23.43 )-----------( 84       ( 07 Aug 2018 07:09 )             28.2     08-07    142  |  108  |  17  ----------------------------<  208<H>  4.5   |  24  |  1.44<H>    Ca    8.6      07 Aug 2018 07:09  Phos  2.3     08-06  Mg     1.9     08-06      BRAIN MRI:    Several acute-subacute subcentimeter infarcts within the left cerebellum,   right frontal centrum semiovale, right precentral gyrus and lateral left   superior frontal gyrus, 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 No acute intracranial hemorrhage. No significant   mass effect. No midline shift.  Chronic small left frontal and parietal white matter infarcts.  Chronic bilateral cerebellar lacunar infarcts.    BRAIN MRA:    No significant stenosis, occlusion, or aneurysm.  Mild-moderate focal stenosis of the proximal bilateral petrous segments   ofthe ICAs.  Fetal origin of the bilateral PCAs.  Right vertebral artery likely terminates at the PICA.    NECK MRA:  No hemodynamically significant stenosis or occlusion.  Partially imaged moderate sized left pleural effusion, corresponding to   findings on chest radiograph of 8/4/18.      2D Echo:   Summary     Normal appearing mitral valve structure and function.   EA reversal of the mitral inflow consistent with reduced compliance of   the   left ventricle.   Moderate (2+) mitral regurgitation is present.   Mild mitral annular calcification is present.   Normal aortic valve structure and function.   Mild (1+) aortic regurgitation is present.   Normal appearing tricuspid valve structure and function.   Mild (1+) tricuspid valve regurgitation is present.   Normal appearing pulmonic valve structure and function.   Mild pulmonic valvular regurgitation (1+) is present.   Normal appearing left atrium.   The left ventricle is normal in size, wall thickness, wall motion and   contractility.   Estimated left ventricular ejection fraction is 55-60 %.        MEDICATIONS  (STANDING):  aspirin 325 milliGRAM(s) Oral daily  atorvastatin 40 milliGRAM(s) Oral at bedtime  cefepime  Injectable. 1000 milliGRAM(s) IV Push every 12 hours  lactobacillus acidophilus 1 Tablet(s) Oral three times a day with meals  levETIRAcetam 500 milliGRAM(s) Oral two times a day  magnesium oxide 400 milliGRAM(s) Oral two times a day with meals  mirtazapine 15 milliGRAM(s) Oral at bedtime  nystatin Powder 1 Application(s) Topical three times a day  pantoprazole    Tablet 40 milliGRAM(s) Oral before breakfast  predniSONE   Tablet 2.5 milliGRAM(s) Oral daily    MEDICATIONS  (PRN):  LORazepam   Injectable 0.5 milliGRAM(s) IntraMuscular every 6 hours PRN Agitation  oxyCODONE    IR 5 milliGRAM(s) Oral every 4 hours PRN Moderate Pain (4 - 6) HPI from ED: 66 y/o female with PMHx of B cell lymphoma, breast ca, DM2, HTN, anemia, depression, seizures, h/o GI bleed, Thrombocytopenia, muscle weakness  presenting to the ED on 8/5/18 from [facility] s/p 2 episodes of seizures as per nursing documentation. 1 seizure happened in the ambulance to the hospital, she was loaded with Keppra.  Pt in ED was awake responsive to stimuli, alert, oriented, and mildly post ictal. Patient poor historian, but reports being diagnosed with UTI and started on Abx at SNF.     8/7/18 - Patient seen and examined at bedside with Dr. Rodriguez. Pt. denies any complaints, resting comfortably.     REVIEW OF SYSTEMS: all negative except for comments above.    Vital Signs Last 24 Hrs  T(C): 37.5 (07 Aug 2018 10:13), Max: 37.5 (07 Aug 2018 10:13)  T(F): 99.5 (07 Aug 2018 10:13), Max: 99.5 (07 Aug 2018 10:13)  HR: 104 (07 Aug 2018 10:13) (84 - 107)  BP: 126/63 (07 Aug 2018 10:13) (123/67 - 130/59)  BP(mean): --  RR: 19 (07 Aug 2018 04:39) (18 - 20)  SpO2: 100% (07 Aug 2018 10:13) (99% - 100%)    PHYSICAL EXAM:    GEN: lying in bed, NAD  HEENT:   NC/AT  CV:  +S1, +S2, RRR  RESP:  CTA B/L  BREAST:  not examined  GI:  abdomen soft, non-tender, non-distended, normoactive BS  RECTAL:  not examined  MSK:   normal muscle tone  EXT:  no edema  NEURO:  alert and oriented to self, confused at times (as per pt's brother that is her baseline)  SKIN:  no rashes, RCW port, site looks clean    Labs & Radiology:                          9.1    23.43 )-----------( 84       ( 07 Aug 2018 07:09 )             28.2     08-07    142  |  108  |  17  ----------------------------<  208<H>  4.5   |  24  |  1.44<H>    Ca    8.6      07 Aug 2018 07:09  Phos  2.3     08-06  Mg     1.9     08-06      BRAIN MRI:    Several acute-subacute subcentimeter infarcts within the left cerebellum,   right frontal centrum semiovale, right precentral gyrus and lateral left   superior frontal gyrus, 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 No acute intracranial hemorrhage. No significant   mass effect. No midline shift.  Chronic small left frontal and parietal white matter infarcts.  Chronic bilateral cerebellar lacunar infarcts.    BRAIN MRA:    No significant stenosis, occlusion, or aneurysm.  Mild-moderate focal stenosis of the proximal bilateral petrous segments   ofthe ICAs.  Fetal origin of the bilateral PCAs.  Right vertebral artery likely terminates at the PICA.    NECK MRA:  No hemodynamically significant stenosis or occlusion.  Partially imaged moderate sized left pleural effusion, corresponding to   findings on chest radiograph of 8/4/18.      2D Echo:   Summary     Normal appearing mitral valve structure and function.   EA reversal of the mitral inflow consistent with reduced compliance of   the   left ventricle.   Moderate (2+) mitral regurgitation is present.   Mild mitral annular calcification is present.   Normal aortic valve structure and function.   Mild (1+) aortic regurgitation is present.   Normal appearing tricuspid valve structure and function.   Mild (1+) tricuspid valve regurgitation is present.   Normal appearing pulmonic valve structure and function.   Mild pulmonic valvular regurgitation (1+) is present.   Normal appearing left atrium.   The left ventricle is normal in size, wall thickness, wall motion and   contractility.   Estimated left ventricular ejection fraction is 55-60 %.        MEDICATIONS  (STANDING):  aspirin 325 milliGRAM(s) Oral daily  atorvastatin 40 milliGRAM(s) Oral at bedtime  cefepime  Injectable. 1000 milliGRAM(s) IV Push every 12 hours  lactobacillus acidophilus 1 Tablet(s) Oral three times a day with meals  levETIRAcetam 500 milliGRAM(s) Oral two times a day  magnesium oxide 400 milliGRAM(s) Oral two times a day with meals  mirtazapine 15 milliGRAM(s) Oral at bedtime  nystatin Powder 1 Application(s) Topical three times a day  pantoprazole    Tablet 40 milliGRAM(s) Oral before breakfast  predniSONE   Tablet 2.5 milliGRAM(s) Oral daily    MEDICATIONS  (PRN):  LORazepam   Injectable 0.5 milliGRAM(s) IntraMuscular every 6 hours PRN Agitation  oxyCODONE    IR 5 milliGRAM(s) Oral every 4 hours PRN Moderate Pain (4 - 6)

## 2018-08-07 NOTE — PROGRESS NOTE ADULT - SUBJECTIVE AND OBJECTIVE BOX
HPI:  68 y/o female with PMHx of B cell lymphoma, breast ca, DM2, HTN, anemia, depression, seizures, h/o GI bleed, Thrombocytopenia, muscle weakness  presenting to the ED from [facility] s/p 2 episodes of seizures as per nursing documentation. 1 seizure happened  in the ambulance to the hospital, she was loaded with Keppra.   Pt in ED was awake responsive to stimuli, alert, oriented, and mildly post ictal. Patient poor historian, but reports being diagnosed with UTI and strated on Abx at SNF, denies CP, cough, palpitations HA, visual changes, numbness or weakness, except generalized weakness, denies abd pain, urinary sx.  Patient does not recall events, postictal , confused where she is , but able to answer questions appropriately. Pt was given keppra in ER and states that she was at [facility] for Re[facility] and walks with walker and all her work up and tests were done at [facility].     8/6/18 : Pt feels better, still mildly confused. No recurrent seizures per staff. had EEG no epileptic activity noted. Official report pending. No new c/o. MRI pending.    8/7/18 : Pt stable , confused, no recurrent seizures. MRI embolic infarcts, Seen by Dr. Benavidez , family requests comfort care, no other new c/o.    MEDICATIONS  (STANDING):  aspirin 325 milliGRAM(s) Oral daily  atorvastatin 40 milliGRAM(s) Oral at bedtime  cefepime  Injectable. 1000 milliGRAM(s) IV Push every 12 hours  lactobacillus acidophilus 1 Tablet(s) Oral three times a day with meals  levETIRAcetam 500 milliGRAM(s) Oral two times a day  magnesium oxide 400 milliGRAM(s) Oral two times a day with meals  mirtazapine 15 milliGRAM(s) Oral at bedtime  nystatin Powder 1 Application(s) Topical three times a day  pantoprazole    Tablet 40 milliGRAM(s) Oral before breakfast  predniSONE   Tablet 2.5 milliGRAM(s) Oral daily      Vital Signs Last 24 Hrs  T(C): 37.5 (07 Aug 2018 10:13), Max: 37.5 (07 Aug 2018 10:13)  T(F): 99.5 (07 Aug 2018 10:13), Max: 99.5 (07 Aug 2018 10:13)  HR: 104 (07 Aug 2018 10:13) (84 - 107)  BP: 126/63 (07 Aug 2018 10:13) (123/67 - 130/59)  BP(mean): --  RR: 19 (07 Aug 2018 04:39) (18 - 20)  SpO2: 100% (07 Aug 2018 10:13) (99% - 100%)    Neurological exam:  HF: A x O x 3. Appropriately interactive, normal affect. Speech fluent, No Aphasia .  CN: VISHNU, EOMI, VFF, facial sensation normal, no NLFD, gag intact.  Motor: No pronator drift, gen mild bilat weakness without focality.   Sens: Intact to light touch   Reflexes: Symmetric and normal . BJ 2+, BR 2+, KJ 1+, AJ 1+, downgoing toes b/l  Coord:   slow FN   Gait/Balance: Cannot test                            9.1    23.43 )-----------( 84       ( 07 Aug 2018 07:09 )             28.2     08-07    142  |  108  |  17  ----------------------------<  208<H>  4.5   |  24  |  1.44<H>    Ca    8.6      07 Aug 2018 07:09  Phos  2.3     08-06  Mg     1.9     08-06      08-06 [id number] 5.6    08-06 Chol 83 LDL 29 HDL 37<L> Trig 83    Radiology report:  - CT Head: 8/4/18    < from: CT Head No Cont (08.04.18 @ 16:10) >    IMPRESSION:       No acute intracranial findings. 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     < from: MR Head No Cont (08.06.18 @ 09:45) >  IMPRESSION:    BRAIN MRI:  Several acute-subacute subcentimeter infarcts within the left cerebellum,   right frontal centrum semiovale, right precentral gyrus and lateral left   superior frontal gyrus, 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 No acute intracranial hemorrhage. No significant   mass effect. No midline shift.    Chronic small left frontal and parietal white matter infarcts.    Chronic bilateral cerebellar lacunar infarcts.    < from: MR Head No Cont (08.06.18 @ 09:45) >  BRAIN MRA:  No significant stenosis, occlusion, or aneurysm.    Mild-moderate focal stenosis of the proximal bilateral petrous segments   ofthe ICAs.    Fetal origin of the bilateral PCAs.    Right vertebral artery likely terminates at the PICA.    NECK MRA:  No hemodynamically significant stenosis or occlusion.    Partially imaged moderate sized left pleural effusion, corresponding to   findings on chest radiograph of 8/4/18.    Results discussed with Dr.TETYANA ESPARZA, by radiologist Dr. Garcia at   10:30 AM on August 6, 2018.     < from: EEG (08.06.18 @ 08:30) >  Impression : Limited EEG. Slow background activity consistent with a   diffuse cerebral dysfunction may be on the basis of a diffuse metabolic,   toxic or structural abnormality. Clinical correlation recommended.

## 2018-08-07 NOTE — PROGRESS NOTE ADULT - ASSESSMENT
ASSESSMENT AND PLAN:    * Metabolic encephalopathy   * Recurrent seizures due to acute stoke   * Acute/subacute multiple infarcts consistent with Embolic stroke   - monitor on tele  - s/p IV ativan, Fosphenytoin, Vimpat  - c/w with higher dose of keppra 500 BID  - neurology consult appreciated   - MRI/MRA - acute subacute left cerebellum, bilateral frontal consistent with embolic stroke  - TSH, B12 - wnl   - speech and swallow evaluation appreciated ---> soft mechanical DM, thin liquids; straw OK  - PT evaluation appreciated ---> pt extremely weak and unable to tolerate ambulating  - c/w ASA 325mg and Lipitor 40mg  - GOC discussed with brother Francois - DNR/DNI  - comfort measures, does not want aggressive management, ok with IV abx, ok with home hospice   - palliative consult  - 2D echo reviewed  - cardiology consult ordered - spoke with Dr. Benavidez as patient's brother does not want any further cardiac testing/procedures    * UTI, worsening of leukocytosis   - urine cx contaminated  - ID consult appreciated ---> will optimize antibiotics to cefepime on off chance there is Pseudomonas  - trend WBC  - worsening     * Acute heart failure systolic vs diastolic  - does not appear fluid overloaded  - CXR Impression: Small to mild left pleural effusion with left basilar atelectasis  - BNP 24832   - 2D echo EF WNL 55-60%  - hold off on further diuresis at this time    * Thrombocytopenia, Anemia h/o breast CA, B- cell lymphoma  - c/w PO prednisone   - oncology consult appreciated ---> shall contact Dr Montalvo at [facility]  - monitor    * JOSE   - s/p IV fluids  - avoid nephrotoxins    * h/o DM2  - a1C  5.6  - stop FBG     * GERD h/o GI bleed  - c/w PPI    * Protein-calories Malnutrition severe  - nutritional consult appreciated  - c/w remeron    * DVT proph  - due to low platelets will hold heparin for now     * Dispo  - as per pt's brother Francois he wants to take his sister home on hospice. Palliative care consult ordered. I reviewed medications with him and he is okay with continuing all meds and antibiotics at this time. Pt. is DNR/DNI. ASSESSMENT AND PLAN:    * Metabolic encephalopathy   * Recurrent seizures due to acute stoke   * Acute/subacute multiple infarcts consistent with Embolic stroke   - monitor on tele  - s/p IV ativan, Fosphenytoin, Vimpat  - c/w with higher dose of keppra 500 BID  - neurology consult appreciated   - MRI/MRA - acute subacute left cerebellum, bilateral frontal consistent with embolic stroke  - TSH, B12 - wnl   - speech and swallow evaluation appreciated ---> soft mechanical DM, thin liquids; straw OK  - PT evaluation appreciated ---> pt extremely weak and unable to tolerate ambulating  - c/w ASA 325mg and Lipitor 40mg  - GOC discussed with brother Francois - DNR/DNI  - comfort measures, does not want aggressive management, ok with IV abx, ok with home hospice   - palliative consult  - 2D echo reviewed  - cardiology consult ordered - spoke with Dr. Benavidez as patient's brother does not want any further cardiac testing/procedures    * UTI, worsening of leukocytosis   - urine cx contaminated  - ID consult appreciated ---> will optimize antibiotics to cefepime on off chance there is Pseudomonas  - trend WBC  - worsening     * Acute heart failure systolic vs diastolic  - does not appear fluid overloaded  - CXR Impression: Small to mild left pleural effusion with left basilar atelectasis  - BNP 92741   - 2D echo EF WNL 55-60%  - hold off on further diuresis at this time    * Thrombocytopenia, Anemia h/o breast CA, B- cell lymphoma  - c/w PO prednisone   - oncology consult appreciated ---> shall contact Dr Montalvo at [facility]  - monitor    * JOSE   - s/p IV fluids  - avoid nephrotoxins    * h/o DM2  - a1C  5.6  - stop FBG     * GERD h/o GI bleed  - c/w PPI    * Protein-calories Malnutrition severe  - nutritional consult appreciated  - c/w remeron    * DVT proph  - due to low platelets will hold heparin for now     * Dispo  - as per pt's brother Francois he wants to take his sister home on hospice. Palliative care consult ordered. I reviewed medications with him and he is okay with continuing all meds and antibiotics at this time. Pt. is DNR/DNI.   - pt with EXTREMELY poor venous access. RCW port accessed. ASSESSMENT AND PLAN:    * Metabolic encephalopathy   * Recurrent seizures due to acute stoke   * Acute/subacute multiple infarcts consistent with Embolic stroke   - monitor on tele  - s/p IV ativan, Fosphenytoin, Vimpat  - c/w with higher dose of keppra 500 BID  - neurology consult appreciated   - MRI/MRA - acute subacute left cerebellum, bilateral frontal consistent with embolic stroke  - TSH, B12 - wnl   - speech and swallow evaluation appreciated ---> soft mechanical DM, thin liquids; straw OK  - PT evaluation appreciated ---> pt extremely weak and unable to tolerate ambulating  - c/w ASA 325mg and Lipitor 40mg  - GOC discussed with brother Francois - DNR/DNI  - comfort measures, does not want aggressive management, ok with IV abx, ok with home hospice   - palliative consult  - 2D echo reviewed  - cardiology consult ordered - spoke with Dr. Benavidez as patient's brother does not want any further cardiac testing/procedures    * UTI, worsening of leukocytosis   - urine cx contaminated  - ID consult appreciated ---> will optimize antibiotics to cefepime on off chance there is Pseudomonas  - trend WBC  - worsening   - IV cefepime     * Acute heart failure systolic vs diastolic  - does not appear fluid overloaded  - CXR Impression: Small to mild left pleural effusion with left basilar atelectasis  - BNP 42603   - 2D echo EF WNL 55-60%  - hold off on further diuresis at this time    * Thrombocytopenia, Anemia h/o breast CA, B- cell lymphoma  - c/w PO prednisone   - oncology consult appreciated ---> shall contact Dr Montalvo at [facility]  - monitor    * JOSE   - s/p IV fluids  - avoid nephrotoxins    * h/o DM2  - a1C  5.6  - stop FBG     * GERD h/o GI bleed  - c/w PPI    * Protein-calories Malnutrition severe  - nutritional consult appreciated  - c/w remeron    * DVT proph  - due to low platelets will hold heparin for now     * Dispo  - as per pt's brother Francois he wants to take his sister home on hospice. Palliative care consult ordered. I reviewed medications with him and he is okay with continuing all meds and antibiotics at this time. Pt. is DNR/DNI.   - pt with EXTREMELY poor venous access. RCW port accessed.

## 2018-08-07 NOTE — PROGRESS NOTE ADULT - ASSESSMENT
68 y/o female with PMHx of B cell lymphoma, breast ca, DM2, HTN, anemia, depression, seizures, h/o GI bleed, Thrombocytopenia, muscle weakness admitted on 8/4 from snf for evaluation of seizures at snf; patient was on rx for urinary tract infection prior to admission. History per medical record as patient is overall a poor historian.  1. Patient admitted with seizures; found to have urinary tract infection, culture suggestive of more than one organism, ? contamination and still with high wbc count  - day #2 cefepime on off chance there is Pseudomonas  - tolerating antibiotics without rashes or side effects   - when ready for discharge will transition to oral antibiotics as patient being considered for hospice  - iv hydration and supportive care   - serial cbc and monitor temperature   - reviewed prior medical records to evaluate for resistant or atypical pathogens   - will not use carbapenem given that patient was admitted with seizures  2. other issues:  B cell lymphoma, breast ca, DM2, HTN, anemia, depression, seizures, h/o GI bleed, Thrombocytopenia, muscle weakness  - per medicine

## 2018-08-07 NOTE — PROGRESS NOTE ADULT - ASSESSMENT
· Assessment		    68 y/o female with PMHx of B cell lymphoma, breast ca, DM2, HTN, anemia, depression, seizures, h/o GI bleed, Thrombocytopenia, muscle weakness  presenting to the ED from [facility] s/p 2 episodes of seizures as per nursing documentation    admitted with Recurrent seizures now controlled with keppra    REC admit.   MRI brain with Contrast revealed multiple embolic infarcts.  Seen by Cardiology & Oncology.  Continue Keppra 500 mg q 12 hrs.  EEG  completed no epileptic activity.  Correct underlying metabolic causes.  Obtain information from Pt's oncologist from [facility].  Supportive  care.  F/u as needed.

## 2018-08-07 NOTE — PROGRESS NOTE ADULT - ATTENDING COMMENTS
Patient seen and examined with NP Kaykay Watters .  I personally had a face-to-face encounter with the patient, examined the patient myself and reviewed the plan of care with the patient and NP.   I agree with the assessment and plan of NP as stated and discussed.      pt has multiple comorbidities, poor prognosis at this time, HCP wishes to continue comfort care only, palliative medicine team on the case, will plan home vs inpatient hospice placement   continue IV abx for now

## 2018-08-08 ENCOUNTER — TRANSCRIPTION ENCOUNTER (OUTPATIENT)
Age: 68
End: 2018-08-08

## 2018-08-08 VITALS
DIASTOLIC BLOOD PRESSURE: 56 MMHG | RESPIRATION RATE: 17 BRPM | HEART RATE: 100 BPM | SYSTOLIC BLOOD PRESSURE: 112 MMHG | TEMPERATURE: 99 F | OXYGEN SATURATION: 99 %

## 2018-08-08 LAB
ANION GAP SERPL CALC-SCNC: 6 MMOL/L — SIGNIFICANT CHANGE UP (ref 5–17)
BUN SERPL-MCNC: 19 MG/DL — SIGNIFICANT CHANGE UP (ref 7–23)
CALCIUM SERPL-MCNC: 8.7 MG/DL — SIGNIFICANT CHANGE UP (ref 8.5–10.1)
CHLORIDE SERPL-SCNC: 106 MMOL/L — SIGNIFICANT CHANGE UP (ref 96–108)
CO2 SERPL-SCNC: 27 MMOL/L — SIGNIFICANT CHANGE UP (ref 22–31)
CREAT SERPL-MCNC: 1.27 MG/DL — SIGNIFICANT CHANGE UP (ref 0.5–1.3)
GLUCOSE SERPL-MCNC: 139 MG/DL — HIGH (ref 70–99)
HCT VFR BLD CALC: 26 % — LOW (ref 34.5–45)
HGB BLD-MCNC: 8.3 G/DL — LOW (ref 11.5–15.5)
MCHC RBC-ENTMCNC: 31.1 PG — SIGNIFICANT CHANGE UP (ref 27–34)
MCHC RBC-ENTMCNC: 31.9 GM/DL — LOW (ref 32–36)
MCV RBC AUTO: 97.4 FL — SIGNIFICANT CHANGE UP (ref 80–100)
NRBC # BLD: 0 /100 WBCS — SIGNIFICANT CHANGE UP (ref 0–0)
PLATELET # BLD AUTO: 73 K/UL — LOW (ref 150–400)
POTASSIUM SERPL-MCNC: 4.1 MMOL/L — SIGNIFICANT CHANGE UP (ref 3.5–5.3)
POTASSIUM SERPL-SCNC: 4.1 MMOL/L — SIGNIFICANT CHANGE UP (ref 3.5–5.3)
RBC # BLD: 2.67 M/UL — LOW (ref 3.8–5.2)
RBC # FLD: 17.2 % — HIGH (ref 10.3–14.5)
SODIUM SERPL-SCNC: 139 MMOL/L — SIGNIFICANT CHANGE UP (ref 135–145)
WBC # BLD: 16.96 K/UL — HIGH (ref 3.8–10.5)
WBC # FLD AUTO: 16.96 K/UL — HIGH (ref 3.8–10.5)

## 2018-08-08 RX ORDER — MIRTAZAPINE 45 MG/1
1 TABLET, ORALLY DISINTEGRATING ORAL
Qty: 0 | Refills: 0 | COMMUNITY
Start: 2018-08-08

## 2018-08-08 RX ORDER — NYSTATIN CREAM 100000 [USP'U]/G
1 CREAM TOPICAL
Qty: 0 | Refills: 0 | COMMUNITY
Start: 2018-08-08

## 2018-08-08 RX ORDER — ASPIRIN/CALCIUM CARB/MAGNESIUM 324 MG
1 TABLET ORAL
Qty: 0 | Refills: 0 | COMMUNITY
Start: 2018-08-08

## 2018-08-08 RX ORDER — ATORVASTATIN CALCIUM 80 MG/1
1 TABLET, FILM COATED ORAL
Qty: 0 | Refills: 0 | COMMUNITY
Start: 2018-08-08

## 2018-08-08 RX ORDER — LEVETIRACETAM 250 MG/1
1 TABLET, FILM COATED ORAL
Qty: 0 | Refills: 0 | COMMUNITY
Start: 2018-08-08

## 2018-08-08 RX ORDER — LACTOBACILLUS ACIDOPHILUS 100MM CELL
0 CAPSULE ORAL
Qty: 0 | Refills: 0 | COMMUNITY
Start: 2018-08-08

## 2018-08-08 RX ADMIN — CEFEPIME 1000 MILLIGRAM(S): 1 INJECTION, POWDER, FOR SOLUTION INTRAMUSCULAR; INTRAVENOUS at 16:10

## 2018-08-08 RX ADMIN — LEVETIRACETAM 500 MILLIGRAM(S): 250 TABLET, FILM COATED ORAL at 16:10

## 2018-08-08 RX ADMIN — Medication 1 TABLET(S): at 13:23

## 2018-08-08 RX ADMIN — MAGNESIUM OXIDE 400 MG ORAL TABLET 400 MILLIGRAM(S): 241.3 TABLET ORAL at 08:24

## 2018-08-08 RX ADMIN — Medication 1 TABLET(S): at 17:17

## 2018-08-08 RX ADMIN — CEFEPIME 1000 MILLIGRAM(S): 1 INJECTION, POWDER, FOR SOLUTION INTRAMUSCULAR; INTRAVENOUS at 05:22

## 2018-08-08 RX ADMIN — PANTOPRAZOLE SODIUM 40 MILLIGRAM(S): 20 TABLET, DELAYED RELEASE ORAL at 05:21

## 2018-08-08 RX ADMIN — NYSTATIN CREAM 1 APPLICATION(S): 100000 CREAM TOPICAL at 13:23

## 2018-08-08 RX ADMIN — MAGNESIUM OXIDE 400 MG ORAL TABLET 400 MILLIGRAM(S): 241.3 TABLET ORAL at 17:17

## 2018-08-08 RX ADMIN — NYSTATIN CREAM 1 APPLICATION(S): 100000 CREAM TOPICAL at 05:22

## 2018-08-08 RX ADMIN — Medication 325 MILLIGRAM(S): at 11:16

## 2018-08-08 RX ADMIN — Medication 1 TABLET(S): at 08:24

## 2018-08-08 RX ADMIN — LEVETIRACETAM 500 MILLIGRAM(S): 250 TABLET, FILM COATED ORAL at 05:21

## 2018-08-08 RX ADMIN — Medication 2.5 MILLIGRAM(S): at 05:21

## 2018-08-08 NOTE — DISCHARGE NOTE ADULT - PLAN OF CARE
comfort measures comfort measures , d/w Francois Dukes  - HCP continue higher dose of keppra , lorazepam as needed for seizures , anxiety comfort  measures, DNR/DNI take aspirin and atorvastatin, supportive care   no aggressive work-up as per HCP

## 2018-08-08 NOTE — DISCHARGE NOTE ADULT - CARE PLAN
Principal Discharge DX:	Breast cancer  Goal:	comfort measures  Assessment and plan of treatment:	comfort measures , d/w Francois Dukes  - HCP  Secondary Diagnosis:	Seizure  Assessment and plan of treatment:	continue higher dose of keppra , lorazepam as needed for seizures , anxiety  Secondary Diagnosis:	Lymphoma  Assessment and plan of treatment:	comfort  measures, DNR/DNI  Secondary Diagnosis:	CVA (cerebral vascular accident)  Assessment and plan of treatment:	take aspirin and atorvastatin, supportive care   no aggressive work-up as per HCP

## 2018-08-08 NOTE — DISCHARGE NOTE ADULT - NS AS DC STROKE ED MATERIALS
Call 911 for Stroke/Stroke Education Booklet/Prescribed Medications/Need for Followup After Discharge/Stroke Warning Signs and Symptoms/Risk Factors for Stroke

## 2018-08-08 NOTE — DISCHARGE NOTE ADULT - PATIENT PORTAL LINK FT
You can access the [facility] Patient Portal, offered by [facility], by registering with the following website: [email or web address]

## 2018-08-08 NOTE — DISCHARGE NOTE ADULT - CARE PROVIDER_API CALL
Cuate Mathur), Critical Care Medicine; HospicePalliative Medicine; Internal Medicine; Pulmonary Disease  [address] [place] [address]  Phone: [phone]  Fax: [phone]

## 2018-08-08 NOTE — DISCHARGE NOTE ADULT - MEDICATION SUMMARY - MEDICATIONS TO CHANGE
I will SWITCH the dose or number of times a day I take the medications listed below when I get home from the hospital:    Keppra 250 mg oral tablet  -- 1 tab(s) by mouth 3 times a day

## 2018-08-08 NOTE — DISCHARGE NOTE ADULT - MEDICATION SUMMARY - MEDICATIONS TO STOP TAKING
I will STOP taking the medications listed below when I get home from the hospital:    cefpodoxime 200 mg oral tablet  -- 1 tab(s) by mouth once a day (at bedtime)    Flagyl 500 mg oral tablet  -- 1 tab(s) by mouth 3 times a day    potassium chloride 10 mEq oral capsule, extended release  -- 1 cap(s) by mouth 2 times a day

## 2018-08-08 NOTE — DISCHARGE NOTE ADULT - HOSPITAL COURSE
cc - seizures   · Subjective and Objective: 	  HPI from ED: 66 y/o female with PMHx of B cell lymphoma, breast ca, DM2, HTN, anemia, depression, seizures, h/o GI bleed, Thrombocytopenia, muscle weakness  presenting to the ED on 8/5/18 from [facility] s/p 2 episodes of seizures as per nursing documentation. 1 seizure happened in the ambulance to the hospital, she was loaded with Keppra.  Pt in ED was awake responsive to stimuli, alert, oriented, and mildly post ictal. Patient poor historian, but reports being diagnosed with UTI and started on Abx at SNF.     8/7/18 - Patient seen and examined at bedside with Dr. Rodriguez. Pt. denies any complaints, resting comfortably.   8/8/18 - pt seen and examined , denies pain, no seizures, cleared by neurology and ID for discharge, discussed with HCP Francois over the phone   REVIEW OF SYSTEMS: all negative except for comments above.  T(C): 37.2 (08-08-18 @ 10:12), Max: 37.5 (08-08-18 @ 04:46)  T(F): 99 (08-08-18 @ 10:12), Max: 99.5 (08-08-18 @ 04:46)  HR: 115 (08-08-18 @ 10:12) (99 - 115)  BP: 90/55 (08-08-18 @ 10:12) (90/55 - 122/57)  RR: 18 (08-08-18 @ 10:12) (18 - 18)  SpO2: 100% (08-08-18 @ 10:12) (100% - 100%)  Wt(kg): --  * Metabolic encephalopathy , resolving   * Recurrent seizures due to acute stoke   * Acute/subacute multiple infarcts consistent with Embolic stroke   - monitor on tele  - s/p IV ativan, Fosphenytoin, Vimpat  - c/w with higher dose of keppra 500 BID  - neurology consult appreciated   - MRI/MRA - acute subacute left cerebellum, bilateral frontal consistent with embolic stroke  - TSH, B12 - wnl   - speech and swallow evaluation appreciated ---> soft mechanical DM, thin liquids; straw OK  - PT evaluation appreciated ---> pt extremely weak and unable to tolerate ambulating  - c/w ASA 325mg and Lipitor 40mg  - GOC discussed with brother Francois - DNR/DNI  - comfort measures, does not want aggressive management, ok with IV abx, ok with home hospice   - palliative consult  - 2D echo reviewed  - cardiology consult ordered - spoke with Dr. Benavidez as patient's brother does not want any further cardiac testing/procedures  * UTI , partially treated   * Leukocytosis can be due to underlying lymphoma and beeing on po prednisone  - urine cx contaminated  - ID consult appreciated ---> will optimize antibiotics to cefepime on off chance there is Pseudomonas  - wbc trending down  - IV cefepime --> augmentin   * Acute heart failure systolic vs diastolic  - does not appear fluid overloaded  - CXR Impression: Small to mild left pleural effusion with left basilar atelectasis  - BNP 79975   - 2D echo EF WNL 55-60%  - hold off on further diuresis at this time  * Thrombocytopenia, Anemia h/o breast CA, B- cell lymphoma  - c/w PO prednisone   - oncology consult appreciated   - monitor  * JOSE   - s/p IV fluids  - avoid nephrotoxins  * h/o DM2  - a1C  5.6  - stop FBG   * GERD h/o GI bleed  - c/w PPI  * Protein-calories Malnutrition severe  - nutritional consult appreciated  - c/w remeron  * Dispo  - as per pt's brother Francois he wants to take his sister home on hospice. Palliative care consult ordered. I reviewed medications with him and he is okay with continuing all meds and antibiotics at this time. Pt. is DNR/DNI.   - Patient  has grave prognosis at this time - progression of cancer, new stroke, seizures, h/o multiple comorbidities and very por functional status  making the patient poor candidate for any further chemo or radiation therapy   - pt with EXTREMELY poor venous access. RCW port accessed.   Disposition - medically optimized to be discharge to [facility] with hospice care and  close follow up with PCP at facility   complete antibiotics po   return to ED if fever, abdominal pain, nausea, vomiting, chest pain, dyspnea  Discharge plan discussed with patient, RN  Patient advised to follow up with PCP within 3-7 days  time spend 40 min  Left the message to Dr. Mathur at [facility] PCP

## 2018-08-08 NOTE — DISCHARGE NOTE ADULT - OTHER SIGNIFICANT FINDINGS
Complete Blood Count in AM (08.08.18 @ 05:30)    Nucleated RBC: 0 /100 WBCs    WBC Count: 16.96 K/uL    RBC Count: 2.67 M/uL    Hemoglobin: 8.3 g/dL    Hematocrit: 26.0 %    Mean Cell Volume: 97.4 fl    Mean Cell Hemoglobin: 31.1 pg    Mean Cell Hemoglobin Conc: 31.9 gm/dL    Red Cell Distrib Width: 17.2 %    Platelet Count - Automated: 73 K/uL  Basic Metabolic Panel in AM (08.08.18 @ 05:30)    Sodium, Serum: 139 mmol/L    Potassium, Serum: 4.1 mmol/L    Chloride, Serum: 106 mmol/L    Carbon Dioxide, Serum: 27 mmol/L    Anion Gap, Serum: 6 mmol/L    Blood Urea Nitrogen, Serum: 19 mg/dL    Creatinine, Serum: 1.27 mg/dL    Glucose, Serum: 139 mg/dL    Calcium, Total Serum: 8.7 mg/dL      < from: MR Head No Cont (08.06.18 @ 09:45) >  BRAIN MRI:  Several acute-subacute subcentimeter infarcts within the left cerebellum,   right frontal centrum semiovale, right precentral gyrus and lateral left   superior frontal gyrus, 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 No acute intracranial hemorrhage. No significant   mass effect. No midline shift.    Chronic small left frontal and parietal white matter infarcts.    Chronic bilateral cerebellar lacunar infarcts.    BRAIN MRA:  No significant stenosis, occlusion, or aneurysm.    Mild-moderate focal stenosis of the proximal bilateral petrous segments   ofthe ICAs.    Fetal origin of the bilateral PCAs.    Right vertebral artery likely terminates at the PICA.    NECK MRA:  No hemodynamically significant stenosis or occlusion.    Partially imaged moderate sized left pleural effusion, corresponding to   findings on chest radiograph of 8/4/18.    < end of copied text >  < from: Xray Chest 1 View AP/PA. (08.04.18 @ 13:14) >  Impression:    Small to mild left pleural effusion with left basilar atelectasis      < end of copied text >  Culture - Blood (08.07.18 @ 07:09)    Specimen Source: .Blood None    Culture Results:   No growth to date.    Culture - Blood (08.06.18 @ 14:15)    Specimen Source: .Blood Blood    Culture Results:   No growth to date.    Culture - Urine (08.06.18 @ 12:15)    Specimen Source: .Urine Catheterized    Culture Results:   <10,000 CFU/ml  Normal Urogenital rex present

## 2018-08-08 NOTE — DISCHARGE NOTE ADULT - MEDICATION SUMMARY - MEDICATIONS TO TAKE
I will START or STAY ON the medications listed below when I get home from the hospital:    predniSONE 2.5 mg oral tablet  -- 1 tab(s) by mouth once a day for low platelets for 10 days  -- Indication: For Home meds    aspirin 325 mg oral tablet  -- 1 tab(s) by mouth once a day  -- Indication: For CVA (cerebral vascular accident)    oxyCODONE 5 mg oral capsule  -- 1 cap(s) by mouth 3 times a day, As Needed pain   -- Indication: For Home meds    levETIRAcetam 500 mg oral tablet  -- 1 tab(s) by mouth 2 times a day  -- Indication: For SEIZURE    LORazepam 0.5 mg oral tablet  -- 1 tab(s) by mouth every 6 hours MDD:2  -- Caution federal law prohibits the transfer of this drug to any person other  than the person for whom it was prescribed.  Do not take this drug if you are pregnant.  May cause drowsiness.  Alcohol may intensify this effect.  Use care when operating dangerous machinery.    -- Indication: For Anxiety or seizures     mirtazapine 15 mg oral tablet  -- 1 tab(s) by mouth once a day (at bedtime)  -- Indication: For Home meds    atorvastatin 40 mg oral tablet  -- 1 tab(s) by mouth once a day (at bedtime)  -- Indication: For Home meds    nystatin 100,000 units/g topical powder  -- 1 application on skin 3 times a day  -- Indication: For Home meds    famotidine 20 mg oral tablet  -- 1 tab(s) by mouth once a day  -- Indication: For Home meds    magnesium oxide 400 mg (241.3 mg elemental magnesium) oral tablet  -- 1 tab(s) by mouth 2 times a day  -- Indication: For Home meds    amoxicillin-clavulanate 500 mg-125 mg oral tablet  -- 1 tab(s) by mouth every 12 hours   -- Finish all this medication unless otherwise directed by prescriber.  Take with food or milk.    -- Indication: For SEpsis    lactobacillus acidophilus oral capsule  -- orally 2 times a day  -- Indication: For Home meds I will START or STAY ON the medications listed below when I get home from the hospital:    predniSONE 2.5 mg oral tablet  -- 1 tab(s) by mouth once a day for low platelets for 5 days  -- Indication: For Low platelets     oxyCODONE 5 mg oral capsule  -- 1 cap(s) by mouth 3 times a day, As Needed pain   -- Indication: For Home meds    aspirin 325 mg oral tablet  -- 1 tab(s) by mouth once a day  -- Indication: For CVA (cerebral vascular accident)    levETIRAcetam 500 mg oral tablet  -- 1 tab(s) by mouth 2 times a day  -- Indication: For SEIZURE    LORazepam 0.5 mg oral tablet  -- 1 tab(s) by mouth every 6 hours, As Needed -for anxiety MDD:2   -- Caution federal law prohibits the transfer of this drug to any person other  than the person for whom it was prescribed.  Do not take this drug if you are pregnant.  May cause drowsiness.  Alcohol may intensify this effect.  Use care when operating dangerous machinery.    -- Indication: For Anxiety    mirtazapine 15 mg oral tablet  -- 1 tab(s) by mouth once a day (at bedtime)  -- Indication: For Home meds    atorvastatin 40 mg oral tablet  -- 1 tab(s) by mouth once a day (at bedtime)  -- Indication: For Home meds    nystatin 100,000 units/g topical powder  -- 1 application on skin 3 times a day  -- Indication: For Home meds    famotidine 20 mg oral tablet  -- 1 tab(s) by mouth once a day  -- Indication: For Home meds    magnesium oxide 400 mg (241.3 mg elemental magnesium) oral tablet  -- 1 tab(s) by mouth 2 times a day  -- Indication: For Home meds    amoxicillin-clavulanate 500 mg-125 mg oral tablet  -- 1 tab(s) by mouth every 12 hours   -- Finish all this medication unless otherwise directed by prescriber.  Take with food or milk.    -- Indication: For SEpsis    lactobacillus acidophilus oral capsule  -- orally 2 times a day  -- Indication: For Home meds

## 2018-08-09 PROBLEM — Z00.00 ENCOUNTER FOR PREVENTIVE HEALTH EXAMINATION: Status: ACTIVE | Noted: 2018-08-09

## 2018-08-09 RX ORDER — MAGNESIUM OXIDE 400 MG ORAL TABLET 241.3 MG
1 TABLET ORAL
Qty: 0 | Refills: 0 | COMMUNITY

## 2018-08-09 RX ORDER — LEVETIRACETAM 250 MG/1
1 TABLET, FILM COATED ORAL
Qty: 0 | Refills: 0 | COMMUNITY

## 2018-08-09 RX ORDER — FAMOTIDINE 10 MG/ML
1 INJECTION INTRAVENOUS
Qty: 0 | Refills: 0 | COMMUNITY

## 2018-08-09 RX ORDER — NYSTATIN CREAM 100000 [USP'U]/G
1 CREAM TOPICAL
Qty: 0 | Refills: 0 | COMMUNITY

## 2018-08-09 RX ORDER — OXYCODONE HYDROCHLORIDE 5 MG/1
1 TABLET ORAL
Qty: 0 | Refills: 0 | COMMUNITY

## 2018-08-09 RX ORDER — LOPERAMIDE HCL 2 MG
1 TABLET ORAL
Qty: 0 | Refills: 0 | COMMUNITY

## 2018-08-09 RX ORDER — POTASSIUM CHLORIDE 20 MEQ
1 PACKET (EA) ORAL
Qty: 0 | Refills: 0 | COMMUNITY

## 2018-08-09 RX ORDER — MIRTAZAPINE 45 MG/1
1 TABLET, ORALLY DISINTEGRATING ORAL
Qty: 0 | Refills: 0 | COMMUNITY

## 2018-08-11 LAB
CULTURE RESULTS: SIGNIFICANT CHANGE UP
SPECIMEN SOURCE: SIGNIFICANT CHANGE UP

## 2018-08-12 LAB
CULTURE RESULTS: SIGNIFICANT CHANGE UP
SPECIMEN SOURCE: SIGNIFICANT CHANGE UP

## 2018-08-15 DIAGNOSIS — D69.6 THROMBOCYTOPENIA, UNSPECIFIED: ICD-10-CM

## 2018-08-15 DIAGNOSIS — C85.10 UNSPECIFIED B-CELL LYMPHOMA, UNSPECIFIED SITE: ICD-10-CM

## 2018-08-15 DIAGNOSIS — I63.40 CEREBRAL INFARCTION DUE TO EMBOLISM OF UNSPECIFIED CEREBRAL ARTERY: ICD-10-CM

## 2018-08-15 DIAGNOSIS — E43 UNSPECIFIED SEVERE PROTEIN-CALORIE MALNUTRITION: ICD-10-CM

## 2018-08-15 DIAGNOSIS — E11.9 TYPE 2 DIABETES MELLITUS WITHOUT COMPLICATIONS: ICD-10-CM

## 2018-08-15 DIAGNOSIS — N17.9 ACUTE KIDNEY FAILURE, UNSPECIFIED: ICD-10-CM

## 2018-08-15 DIAGNOSIS — G93.41 METABOLIC ENCEPHALOPATHY: ICD-10-CM

## 2018-08-15 DIAGNOSIS — I11.0 HYPERTENSIVE HEART DISEASE WITH HEART FAILURE: ICD-10-CM

## 2018-08-15 DIAGNOSIS — I50.41 ACUTE COMBINED SYSTOLIC (CONGESTIVE) AND DIASTOLIC (CONGESTIVE) HEART FAILURE: ICD-10-CM

## 2018-08-15 DIAGNOSIS — N39.0 URINARY TRACT INFECTION, SITE NOT SPECIFIED: ICD-10-CM

## 2018-08-15 DIAGNOSIS — G40.909 EPILEPSY, UNSPECIFIED, NOT INTRACTABLE, WITHOUT STATUS EPILEPTICUS: ICD-10-CM

## 2018-08-15 DIAGNOSIS — Z66 DO NOT RESUSCITATE: ICD-10-CM

## 2018-08-15 DIAGNOSIS — C50.919 MALIGNANT NEOPLASM OF UNSPECIFIED SITE OF UNSPECIFIED FEMALE BREAST: ICD-10-CM

## 2019-02-28 NOTE — SWALLOW BEDSIDE ASSESSMENT ADULT - SLP GENERAL OBSERVATIONS
stress in am cardiovascular tests pending On encounter, pt seated in bed, alert and verbally interactive:  looks frail and weak, very thin, no evidence of primary linguistic pathology or of motor speech deficits:  voice is hypophonic.  pt able to follow oral motor commands with ROM and speed; white coating on tongue (thrush?)

## 2020-01-02 NOTE — PATIENT PROFILE ADULT. - ACCEPTABLE
January 2, 2020     Patient: Gaye Arreola   YOB: 1989   Date of Visit: 1/2/2020       To Whom it May Concern:    Gaye Arreola was seen in my clinic on 1/2/2020 at 9:00 am.    Please excuse Gaye for her absence from work on the date listed above to be able to make her appointment.    Sincerely,         Raúl Sams MD    Medical information is confidential and cannot be disclosed without the written consent of the patient or her representative.      
January 2, 2020     Patient: Gaye Arreola   YOB: 1989   Date of Visit: 1/2/2020       To Whom it May Concern:    Gaye Arreola was seen in my clinic on 1/2/2020 at 9:00 am.    Please excuse Gaye for her absence from work on the date listed above to be able to make her appointment. Based on my assessment, Gaye Arreola will be cleared to return to work on 01/06/2020 at 9 AM.    Sincerely,         Raúl Sams MD    Medical information is confidential and cannot be disclosed without the written consent of the patient or her representative.      
3

## 2021-03-03 NOTE — DISCHARGE NOTE ADULT - HAS THE PATIENT RECEIVED THE INFLUENZA VACCINE DURING THIS VISIT
Central Prior Authorization Team   Phone: [phone]      PA Initiation    Medication: OLANZAPINE ODT 5MG TAB-PA initiated  Insurance Company: [place] Medicaid ([facility]) - Phone [phone] Fax [phone]  Pharmacy Filling the Rx: [facility] - [place], [place] - [address]   Filling Pharmacy Phone: [phone]  Filling Pharmacy Fax:    Start Date: 3/3/2021         No

## 2022-03-03 NOTE — ED ADULT TRIAGE NOTE - SOURCE OF INFORMATION
Preceptor Attestation:    I discussed the patient with the resident and evaluated the patient in person. I have verified the content of the note, which accurately reflects my assessment of the patient and the plan of care.   Supervising Physician:  Willy Chappell MD.       EMS

## 2022-04-29 NOTE — ED PROVIDER NOTE - CARDIAC, MLM
Patient informed of biopsy results and message from Dr. Garcia. Patient verbalized understanding. No further questions and concerns at this time.   Tachycardic, regular rhythm.  Heart sounds S1, S2.  No murmurs, rubs or gallops.

## 2024-04-29 NOTE — H&P ADULT - NSHPPOADEEPVENOUSTHROMB_GEN_A_CORE
Care Due:                  Date            Visit Type   Department     Provider  --------------------------------------------------------------------------------                                EP -                              [facility]  Last Visit: 02-      CARE ([facility])   CHRISTINA Allred                              EP -                              PRIMARY      [facility]  Next Visit: 08-      CARE ([facility])   CHRISTINA Allred                                                            Last  Test          Frequency    Reason                     Performed    Due Date  --------------------------------------------------------------------------------    HBA1C.......  6 months...  metFORMIN, semaglutide...  01- 07-    Lipid Panel.  12 months..  atorvastatin.............  07- 07-    Health [facility] Embedded Care Due Messages. Reference number: [id number].   4/29/2024 9:05:15 AM CDT   no

## 2024-10-21 NOTE — CONSULT NOTE ADULT - SUBJECTIVE AND OBJECTIVE BOX
Patient is a 67y old  Female who presents with a chief complaint of seizures (05 Aug 2018 10:12)    HPI:  68 y/o female with PMHx of B cell lymphoma, breast ca, DM2, HTN, anemia, depression, seizures, h/o GI bleed, Thrombocytopenia, muscle weakness admitted on  from [facility] for evaluation of seizures at [facility]; patient was on rx for urinary tract infection prior to admission. History per medical record as patient is overall a poor historian.            PMH: as above  PSH: as above  Meds: per reconciliation sheet, noted below  MEDICATIONS  (STANDING):  aspirin 325 milliGRAM(s) Oral daily  atorvastatin 40 milliGRAM(s) Oral at bedtime  cefepime  Injectable. 1000 milliGRAM(s) IV Push every 12 hours  dextrose 5%. 1000 milliLiter(s) (50 mL/Hr) IV Continuous <Continuous>  dextrose 50% Injectable 12.5 Gram(s) IV Push once  dextrose 50% Injectable 25 Gram(s) IV Push once  dextrose 50% Injectable 25 Gram(s) IV Push once  insulin lispro (HumaLOG) corrective regimen sliding scale   SubCutaneous three times a day before meals  insulin lispro (HumaLOG) corrective regimen sliding scale   SubCutaneous at bedtime  lactobacillus acidophilus 1 Tablet(s) Oral three times a day with meals  levETIRAcetam 500 milliGRAM(s) Oral two times a day  magnesium oxide 400 milliGRAM(s) Oral two times a day with meals  mirtazapine 15 milliGRAM(s) Oral at bedtime  nystatin Powder 1 Application(s) Topical three times a day  pantoprazole    Tablet 40 milliGRAM(s) Oral before breakfast  predniSONE   Tablet 2.5 milliGRAM(s) Oral daily    MEDICATIONS  (PRN):  dextrose 40% Gel 15 Gram(s) Oral once PRN Blood Glucose LESS THAN 70 milliGRAM(s)/deciliter  glucagon  Injectable 1 milliGRAM(s) IntraMuscular once PRN Glucose LESS THAN 70 milligrams/deciliter  LORazepam   Injectable 0.5 milliGRAM(s) IntraMuscular every 6 hours PRN Agitation  oxyCODONE    IR 5 milliGRAM(s) Oral every 4 hours PRN Moderate Pain (4 - 6)    Allergies    No Known Allergies    Intolerances      Social: no smoking, no alcohol, no illegal drugs; no recent travel, no exposure to TB  FAMILY HISTORY:  Family history of stroke (Father)  Family history of diabetes mellitus (Mother)     no history of premature cardiovascular disease in first degree relatives  ROS: unable to obtain secondary to patient medical condition     Vital Signs Last 24 Hrs  T(C): 36.1 (06 Aug 2018 10:33), Max: 37.2 (06 Aug 2018 04:37)  T(F): 97 (06 Aug 2018 10:33), Max: 98.9 (06 Aug 2018 04:37)  HR: 98 (06 Aug 2018 12:17) (91 - 104)  BP: 128/73 (06 Aug 2018 12:17) (125/72 - 143/73)  BP(mean): --  RR: 18 (06 Aug 2018 10:33) (16 - 18)  SpO2: 100% (06 Aug 2018 12:17) (100% - 100%)  Daily     Daily Weight in k.7 (06 Aug 2018 11:29)    PE:    Constitutional: frail looking  HEENT: NC/AT, EOMI, PERRLA, conjunctivae clear; ears and nose atraumatic; pharynx clear  Neck: supple; thyroid not palpable  Back: no tenderness  Respiratory: respiratory effort normal; clear to auscultation  Cardiovascular: S1S2 regular, no murmurs  Abdomen: soft, not tender, not distended, positive BS; no liver or spleen organomegaly  Genitourinary: no suprapubic tenderness  Musculoskeletal: no muscle tenderness, no joint swelling or tenderness  Neurological/ Psychiatric: AxOx3, judgement and insight normal;  moving all extremities  Skin: no rashes; no palpable lesions; right upper chest mediport in place    Labs: all available labs reviewed                        9.9    21.17 )-----------( 81       ( 06 Aug 2018 07:16 )             30.1     08-06    141  |  108  |  16  ----------------------------<  134<H>  4.9   |  23  |  1.25    Ca    8.7      06 Aug 2018 07:16  Phos  2.3     08-06  Mg     1.9     08-06         Urinalysis Basic - ( 04 Aug 2018 17:42 )    Color: Yellow / Appearance: Clear / S.010 / pH: x  Gluc: x / Ketone: Negative  / Bili: Negative / Urobili: Negative mg/dL   Blood: x / Protein: 100 mg/dL / Nitrite: Negative   Leuk Esterase: Moderate / RBC: 3-5 /HPF / WBC 11-25   Sq Epi: x / Non Sq Epi: Moderate / Bacteria: Few      Culture - Urine (18 @ 17:42)    Specimen Source: .Urine None    Culture Results:   Culture grew 3 or more types of organisms which indicate  collection contamination; consider recollection only if clinically  indicated.            < from: Xray Chest 1 View AP/PA. (18 @ 13:14) >    EXAM:  XR CHEST AP OR PA 1V                            PROCEDURE DATE:  2018          INTERPRETATION:  Exam Date: 2018 1:14 PM    History: Chest pain    Technique: Single frontal portable view of the chest with no prior   studies available for comparison    Findings:    Right-sided Mediport with tip in the SVC. Heart is mildly prominent.   Small to mild left pleural effusion with left basilar atelectasis. The   apices and hemidiaphragms are unremarkable. Degenerative changes of the   visualized osseous structures.        Impression:    Small to mild left pleural effusion with left basilar atelectasis        < end of copied text >        Radiology: all available radiological tests reviewed    Advanced directives addressed: full resuscitation
Se--19 Approved    Filling Pharmacy: [facility]  
Patient is a 67y old  Female who presents with a chief complaint of seizures (05 Aug 2018 10:12)      HPI:  68 y/o female with PMHx of B cell lymphoma, breast ca, DM2, HTN, anemia, depression, seizures, h/o GI bleed, Thrombocytopenia, muscle weakness  presenting to the ED from [facility] s/p 2 episodes of seizures as per nursing documentation. 1 seizure happened  in the ambulance to the hospital, she was loaded with Keppra.   Pt in ED was awake responsive to stimuli, alert, oriented, and mildly post ictal. Patient poor historian, but reports being diagnosed with UTI and strated on Abx at SNF, denies CP, cough, palpitations HA, visual changes, numbness or weakness, except generalized weakness, denies abd pain, urinary sx.  Patient does not recall events, postictal , confused where she is , but able to answer questions appropriately. Pt was given keppra in ER and states that she was at [facility] for [facility] and walks with walker and all her work up and tests were done at [facility].         PAST MEDICAL & SURGICAL HISTORY:  Pleural effusion  Anemia  Depression  HTN (hypertension)  DM (diabetes mellitus)  Breast cancer  Lymphoma  H/O subtotal mastectomy of right breast      FAMILY HISTORY:  Family history of stroke (Father)  Family history of diabetes mellitus (Mother)      Social Hx:  Nonsmoker, no drug or alcohol use    MEDICATIONS  (STANDING):  dextrose 5%. 1000 milliLiter(s) (50 mL/Hr) IV Continuous <Continuous>  dextrose 50% Injectable 12.5 Gram(s) IV Push once  dextrose 50% Injectable 25 Gram(s) IV Push once  dextrose 50% Injectable 25 Gram(s) IV Push once  insulin lispro (HumaLOG) corrective regimen sliding scale   SubCutaneous three times a day before meals  insulin lispro (HumaLOG) corrective regimen sliding scale   SubCutaneous at bedtime  levETIRAcetam 500 milliGRAM(s) Oral two times a day  magnesium oxide 400 milliGRAM(s) Oral two times a day with meals  mirtazapine 15 milliGRAM(s) Oral at bedtime  nystatin Powder 1 Application(s) Topical three times a day  pantoprazole    Tablet 40 milliGRAM(s) Oral before breakfast  potassium chloride    Tablet ER 40 milliEquivalent(s) Oral every 4 hours  predniSONE   Tablet 2.5 milliGRAM(s) Oral daily  sodium chloride 0.9% with potassium chloride 20 mEq/L 1000 milliLiter(s) (50 mL/Hr) IV Continuous <Continuous>       Allergies    No Known Allergies    ROS: Pertinent positives in HPI, all other ROS were reviewed and are negative.      Vital Signs Last 24 Hrs  T(C): 36.3 (05 Aug 2018 13:35), Max: 36.7 (04 Aug 2018 20:00)  T(F): 97.4 (05 Aug 2018 13:35), Max: 98.1 (04 Aug 2018 20:00)  HR: 91 (05 Aug 2018 13:35) (75 - 134)  BP: 136/80 (05 Aug 2018 13:35) (116/63 - 164/90)  BP(mean): --  RR: 16 (05 Aug 2018 13:35) (13 - 20)  SpO2: 100% (05 Aug 2018 13:35) (99% - 100%)        Constitutional: awake and alert, mildly forgetful, cachectic looking  HEENT: PERRLA, EOMI,   Neck: Supple.  Respiratory: Breath sounds are clear bilaterally  Cardiovascular: S1 and S2, regular rhythm  Gastrointestinal: soft, nontender  Extremities:  no edema  Vascular:  Carotid Bruit - no  Musculoskeletal: no joint swelling/tenderness, no abnormal movements  Skin: No rashes    Neurological exam:  HF: A x O x 3. Appropriately interactive, normal affect. Speech fluent, No Aphasia .  CN: VISHNU, EOMI, VFF, facial sensation normal, no NLFD, gag intact.  Motor: No pronator drift, gen mild bilat weakness without focality.   Sens: Intact to light touch   Reflexes: Symmetric and normal . BJ 2+, BR 2+, KJ 1+, AJ 1+, downgoing toes b/l  Coord:   slow FN   Gait/Balance: Cannot test      Labs:   08-05    140  |  104  |  17  ----------------------------<  111<H>  3.2<L>   |  25  |  1.11    Ca    8.7      05 Aug 2018 12:36  Mg     1.4     08-05    TPro  5.2<L>  /  Alb  2.0<L>  /  TBili  0.3  /  DBili  x   /  AST  10<L>  /  ALT  8<L>  /  AlkPhos  186<H>  08-04                              10.2   17.69 )-----------( 78       ( 05 Aug 2018 12:36 )             31.2       Radiology:  - CT Head: 8/4/18    < from: CT Head No Cont (08.04.18 @ 16:10) >    IMPRESSION:       No acute intracranial findings. 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
Patient is a 67y old  Female who presents with a chief complaint of seizures (05 Aug 2018 10:12)      HPI:  66 y/o female with PMHx of B cell lymphoma, breast ca,  the patient is a poor historian but it appears that her breast cancer is in remission but she had Autologous stem cell transplantation following high dose chemotherapy at [facility]  he primary oncologist is Dr Landon at  [facility].  she also has DM2, HTN, anemia, depression, seizures, h/o GI bleed, Thrombocytopenia, muscle weakness  presenting to the ED from [facility] s/p 2 episodes of seizures as per nursing documentation. 1 seizure happened  in the ambulance to the hospital, she was loaded with Keppra.     current MRI shows multiple bilateral CVA         PAST MEDICAL & SURGICAL HISTORY:  Pleural effusion  Anemia  Depression  HTN (hypertension)  DM (diabetes mellitus)  Breast cancer  Lymphoma s/p ASCT. current status unclear  H/O subtotal mastectomy of right breast      REVIEW OF SYSTEMS    General:	  unreliable  no obvious symptoms apart from fatigue at present    Allergies    No Known Allergies    Intolerances          FAMILY HISTORY:  Family history of stroke (Father)  Family history of diabetes mellitus (Mother)      Home Medications:  cefpodoxime 200 mg oral tablet: 1 tab(s) orally once a day (at bedtime) (05 Aug 2018 10:06)  famotidine 20 mg oral tablet: 1 tab(s) orally once a day (05 Aug 2018 10:06)  Flagyl 500 mg oral tablet: 1 tab(s) orally 3 times a day (05 Aug 2018 10:06)  Keppra 250 mg oral tablet: 1 tab(s) orally 3 times a day (05 Aug 2018 10:06)  loperamide 2 mg oral tablet: 1 tab(s) orally every 4 hours, As Needed diarrhea (05 Aug 2018 10:06)  magnesium oxide 400 mg (241.3 mg elemental magnesium) oral tablet: 1 tab(s) orally 2 times a day (05 Aug 2018 10:06)  nystatin 100,000 units/g topical powder: Apply topically to affected area 3 times a day under breast (05 Aug 2018 10:06)  oxyCODONE 5 mg oral capsule: 1 cap(s) orally 3 times a day, As Needed pain  (05 Aug 2018 10:06)  potassium chloride 10 mEq oral capsule, extended release: 1 cap(s) orally 2 times a day (05 Aug 2018 10:06)  predniSONE 2.5 mg oral tablet: 1 tab(s) orally once a day for low platelets for 10 days (05 Aug 2018 10:06)  Remeron 15 mg oral tablet: 1 tab(s) orally once a day (at bedtime) (05 Aug 2018 10:06)      MEDICATIONS  (STANDING):  aspirin 325 milliGRAM(s) Oral daily  atorvastatin 40 milliGRAM(s) Oral at bedtime  cefepime  Injectable. 1000 milliGRAM(s) IV Push every 12 hours  lactobacillus acidophilus 1 Tablet(s) Oral three times a day with meals  levETIRAcetam 500 milliGRAM(s) Oral two times a day  magnesium oxide 400 milliGRAM(s) Oral two times a day with meals  mirtazapine 15 milliGRAM(s) Oral at bedtime  nystatin Powder 1 Application(s) Topical three times a day  pantoprazole    Tablet 40 milliGRAM(s) Oral before breakfast  predniSONE   Tablet 2.5 milliGRAM(s) Oral daily    MEDICATIONS  (PRN):  LORazepam   Injectable 0.5 milliGRAM(s) IntraMuscular every 6 hours PRN Agitation  oxyCODONE    IR 5 milliGRAM(s) Oral every 4 hours PRN Moderate Pain (4 - 6)      PHYSICAL EXAM:  Vital Signs Last 24 Hrs  T(C): 36.5 (06 Aug 2018 17:58), Max: 37.2 (06 Aug 2018 04:37)  T(F): 97.7 (06 Aug 2018 17:58), Max: 98.9 (06 Aug 2018 04:37)  HR: 87 (06 Aug 2018 17:58) (87 - 104)  BP: 123/67 (06 Aug 2018 17:58) (123/67 - 143/73)  BP(mean): --  RR: 18 (06 Aug 2018 17:58) (18 - 18)  SpO2: 100% (06 Aug 2018 17:58) (100% - 100%)      Gen:   pleasant lady who appears comfortable  vague  alopecia+  no lymphadenopathy  no hepatosplenomegaly  neuro  alert and oriented x2  no focal defects        LABS:                        9.9    21.17 )-----------( 81       ( 06 Aug 2018 07:16 )             30.1     06 Aug 2018 07:16    141    |  108    |  16     ----------------------------<  134    4.9     |  23     |  1.25     Ca    8.7        06 Aug 2018 07:16  Phos  2.3       06 Aug 2018 07:16  Mg     1.9       06 Aug 2018 07:16        RADIOLOGY & ADDITIONAL STUDIES:      IMPRESSION:    BRAIN MRI:  Several acute-subacute subcentimeter infarcts within the left cerebellum,   right frontal centrum semiovale, right precentral gyrus and lateral left   superior frontal gyrus, 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 No acute intracranial hemorrhage. No significant   mass effect. No midline shift.    Chronic small left frontal and parietal white matter infarcts.    Chronic bilateral cerebellar lacunar infarcts.    BRAIN MRA:  No significant stenosis, occlusion, or aneurysm.    Mild-moderate focal stenosis of the proximal bilateral petrous segments   of the ICAs.    Fetal origin of the bilateral PCAs.    Right vertebral artery likely terminates at the PICA.    NECK MRA:  No hemodynamically significant stenosis or occlusion.    Partially imaged moderate sized left pleural effusion, corresponding to   findings on chest radiograph of 8/4/18.
[Follow-Up: _____] : a [unfilled] follow-up visit